# Patient Record
Sex: FEMALE | Race: WHITE | NOT HISPANIC OR LATINO | Employment: OTHER | URBAN - METROPOLITAN AREA
[De-identification: names, ages, dates, MRNs, and addresses within clinical notes are randomized per-mention and may not be internally consistent; named-entity substitution may affect disease eponyms.]

---

## 2017-03-13 ENCOUNTER — HISTORICAL (OUTPATIENT)
Dept: LAB | Facility: HOSPITAL | Age: 82
End: 2017-03-13

## 2017-08-01 ENCOUNTER — HISTORICAL (OUTPATIENT)
Dept: LAB | Facility: HOSPITAL | Age: 82
End: 2017-08-01

## 2017-08-01 LAB
BUN SERPL-MCNC: 16 MG/DL (ref 7–18)
CALCIUM SERPL-MCNC: 9.6 MG/DL (ref 8.5–10.1)
CHLORIDE SERPL-SCNC: 100 MMOL/L (ref 98–107)
CO2 SERPL-SCNC: 27.6 MMOL/L (ref 21–32)
CREAT SERPL-MCNC: 0.84 MG/DL (ref 0.6–1.3)
EST. AVERAGE GLUCOSE BLD GHB EST-MCNC: 131 MG/DL
GLUCOSE SERPL-MCNC: 107 MG/DL (ref 74–106)
HBA1C MFR BLD: 6.2 % (ref 4.5–6.2)
POTASSIUM SERPL-SCNC: 5 MMOL/L (ref 3.5–5.1)
SODIUM SERPL-SCNC: 135 MMOL/L (ref 136–145)

## 2018-01-25 ENCOUNTER — HISTORICAL (OUTPATIENT)
Dept: LAB | Facility: HOSPITAL | Age: 83
End: 2018-01-25

## 2018-01-25 LAB
ALBUMIN SERPL-MCNC: 3.6 GM/DL (ref 3.4–5)
ALBUMIN/GLOB SERPL: 0.9 RATIO (ref 1.1–2)
ALP SERPL-CCNC: 82 UNIT/L (ref 46–116)
ALT SERPL-CCNC: 28 UNIT/L (ref 12–78)
AST SERPL-CCNC: 15 UNIT/L (ref 15–37)
BILIRUB SERPL-MCNC: 0.5 MG/DL (ref 0.2–1)
BILIRUBIN DIRECT+TOT PNL SERPL-MCNC: 0.13 MG/DL (ref 0–0.2)
BILIRUBIN DIRECT+TOT PNL SERPL-MCNC: 0.33 MG/DL (ref 0–0.8)
BUN SERPL-MCNC: 11.7 MG/DL (ref 7–18)
CALCIUM SERPL-MCNC: 9.7 MG/DL (ref 8.5–10.1)
CHLORIDE SERPL-SCNC: 98 MMOL/L (ref 98–107)
CHOLEST SERPL-MCNC: 170 MG/DL (ref 0–200)
CHOLEST/HDLC SERPL: 2.2 {RATIO} (ref 0–4)
CO2 SERPL-SCNC: 28.6 MMOL/L (ref 21–32)
CREAT SERPL-MCNC: 0.65 MG/DL (ref 0.6–1.3)
ERYTHROCYTE [DISTWIDTH] IN BLOOD BY AUTOMATED COUNT: 14.1 % (ref 11.5–17)
EST. AVERAGE GLUCOSE BLD GHB EST-MCNC: 128 MG/DL
GLOBULIN SER-MCNC: 3.8 GM/DL (ref 2.4–3.5)
GLUCOSE SERPL-MCNC: 114 MG/DL (ref 74–106)
HBA1C MFR BLD: 6.1 % (ref 4.5–6.2)
HCT VFR BLD AUTO: 38.1 % (ref 37–47)
HDLC SERPL-MCNC: 78 MG/DL (ref 40–60)
HGB BLD-MCNC: 12.4 GM/DL (ref 12–16)
LDLC SERPL CALC-MCNC: 78 MG/DL (ref 0–129)
MCH RBC QN AUTO: 27.5 PG (ref 27–31)
MCHC RBC AUTO-ENTMCNC: 32.6 GM/DL (ref 33–36)
MCV RBC AUTO: 84.2 FL (ref 80–94)
PLATELET # BLD AUTO: 423 X10(3)/MCL (ref 130–400)
PMV BLD AUTO: 7.4 FL (ref 7.4–10.4)
POTASSIUM SERPL-SCNC: 4.7 MMOL/L (ref 3.5–5.1)
PROT SERPL-MCNC: 7.4 GM/DL (ref 6.4–8.2)
RBC # BLD AUTO: 4.53 X10(6)/MCL (ref 4.2–5.4)
SODIUM SERPL-SCNC: 134 MMOL/L (ref 136–145)
TRIGL SERPL-MCNC: 71 MG/DL
VLDLC SERPL CALC-MCNC: 14 MG/DL
WBC # SPEC AUTO: 6.5 X10(3)/MCL (ref 4.5–11.5)

## 2021-11-02 ENCOUNTER — HISTORICAL (OUTPATIENT)
Dept: ADMINISTRATIVE | Facility: HOSPITAL | Age: 86
End: 2021-11-02

## 2021-11-06 LAB — FINAL CULTURE: NO GROWTH

## 2024-01-12 ENCOUNTER — HOSPITAL ENCOUNTER (INPATIENT)
Facility: HOSPITAL | Age: 89
LOS: 2 days | Discharge: HOME HEALTH | DRG: 645 | End: 2024-01-14
Attending: EMERGENCY MEDICINE | Admitting: INTERNAL MEDICINE
Payer: MEDICARE

## 2024-01-12 DIAGNOSIS — R07.9 CHEST PAIN: ICD-10-CM

## 2024-01-12 DIAGNOSIS — R42 DIZZINESS: ICD-10-CM

## 2024-01-12 DIAGNOSIS — I10 HYPERTENSION, UNSPECIFIED TYPE: ICD-10-CM

## 2024-01-12 DIAGNOSIS — E87.1 HYPONATREMIA: Primary | ICD-10-CM

## 2024-01-12 DIAGNOSIS — R55 POSTURAL DIZZINESS WITH NEAR SYNCOPE: ICD-10-CM

## 2024-01-12 DIAGNOSIS — R42 POSTURAL DIZZINESS WITH NEAR SYNCOPE: ICD-10-CM

## 2024-01-12 DIAGNOSIS — I10 HYPERTENSION: ICD-10-CM

## 2024-01-12 LAB
ALBUMIN SERPL-MCNC: 3 G/DL (ref 3.4–4.8)
ALBUMIN/GLOB SERPL: 0.8 RATIO (ref 1.1–2)
ALP SERPL-CCNC: 46 UNIT/L (ref 40–150)
ALT SERPL-CCNC: 19 UNIT/L (ref 0–55)
APPEARANCE UR: CLEAR
AST SERPL-CCNC: 18 UNIT/L (ref 5–34)
BACTERIA #/AREA URNS AUTO: ABNORMAL /HPF
BASOPHILS # BLD AUTO: 0.04 X10(3)/MCL
BASOPHILS NFR BLD AUTO: 0.4 %
BILIRUB SERPL-MCNC: 0.1 MG/DL
BILIRUB UR QL STRIP.AUTO: NEGATIVE
BUN SERPL-MCNC: 37.7 MG/DL (ref 9.8–20.1)
CALCIUM SERPL-MCNC: 8.9 MG/DL (ref 8.4–10.2)
CHLORIDE SERPL-SCNC: 95 MMOL/L (ref 98–111)
CO2 SERPL-SCNC: 21 MMOL/L (ref 23–31)
COLOR UR AUTO: ABNORMAL
CREAT SERPL-MCNC: 0.75 MG/DL (ref 0.55–1.02)
EOSINOPHIL # BLD AUTO: 0.09 X10(3)/MCL (ref 0–0.9)
EOSINOPHIL NFR BLD AUTO: 0.9 %
ERYTHROCYTE [DISTWIDTH] IN BLOOD BY AUTOMATED COUNT: 15.6 % (ref 11.5–17)
GFR SERPLBLD CREATININE-BSD FMLA CKD-EPI: >60 MLS/MIN/1.73/M2
GLOBULIN SER-MCNC: 3.8 GM/DL (ref 2.4–3.5)
GLUCOSE SERPL-MCNC: 106 MG/DL (ref 75–121)
GLUCOSE UR QL STRIP.AUTO: NORMAL
HCT VFR BLD AUTO: 28.7 % (ref 37–47)
HGB BLD-MCNC: 9.2 G/DL (ref 12–16)
IMM GRANULOCYTES # BLD AUTO: 0.06 X10(3)/MCL (ref 0–0.04)
IMM GRANULOCYTES NFR BLD AUTO: 0.6 %
KETONES UR QL STRIP.AUTO: NEGATIVE
LEUKOCYTE ESTERASE UR QL STRIP.AUTO: 75
LYMPHOCYTES # BLD AUTO: 2.22 X10(3)/MCL (ref 0.6–4.6)
LYMPHOCYTES NFR BLD AUTO: 21.5 %
MAGNESIUM SERPL-MCNC: 1.6 MG/DL (ref 1.6–2.6)
MCH RBC QN AUTO: 27.1 PG (ref 27–31)
MCHC RBC AUTO-ENTMCNC: 32.1 G/DL (ref 33–36)
MCV RBC AUTO: 84.4 FL (ref 80–94)
MONOCYTES # BLD AUTO: 0.94 X10(3)/MCL (ref 0.1–1.3)
MONOCYTES NFR BLD AUTO: 9.1 %
MUCOUS THREADS URNS QL MICRO: ABNORMAL /LPF
NEUTROPHILS # BLD AUTO: 6.97 X10(3)/MCL (ref 2.1–9.2)
NEUTROPHILS NFR BLD AUTO: 67.5 %
NITRITE UR QL STRIP.AUTO: NEGATIVE
NRBC BLD AUTO-RTO: 0 %
OSMOLALITY SERPL: 277 MOSM/KG (ref 280–300)
OSMOLALITY UR: 352 MOSM/KG (ref 300–1300)
PH UR STRIP.AUTO: 5 [PH]
PLATELET # BLD AUTO: 517 X10(3)/MCL (ref 130–400)
PMV BLD AUTO: 8.4 FL (ref 7.4–10.4)
POCT GLUCOSE: 95 MG/DL (ref 70–110)
POTASSIUM SERPL-SCNC: 4.7 MMOL/L (ref 3.5–5.1)
PROT SERPL-MCNC: 6.8 GM/DL (ref 5.8–7.6)
PROT UR QL STRIP.AUTO: NEGATIVE
RBC # BLD AUTO: 3.4 X10(6)/MCL (ref 4.2–5.4)
RBC #/AREA URNS AUTO: ABNORMAL /HPF
RBC UR QL AUTO: NEGATIVE
SODIUM SERPL-SCNC: 124 MMOL/L (ref 132–146)
SODIUM UR-SCNC: 63 MMOL/L
SP GR UR STRIP.AUTO: 1.01 (ref 1–1.03)
SQUAMOUS #/AREA URNS LPF: ABNORMAL /HPF
UROBILINOGEN UR STRIP-ACNC: NORMAL
WBC # SPEC AUTO: 10.32 X10(3)/MCL (ref 4.5–11.5)
WBC #/AREA URNS AUTO: ABNORMAL /HPF

## 2024-01-12 PROCEDURE — 83935 ASSAY OF URINE OSMOLALITY: CPT | Performed by: EMERGENCY MEDICINE

## 2024-01-12 PROCEDURE — 25000003 PHARM REV CODE 250

## 2024-01-12 PROCEDURE — 21400001 HC TELEMETRY ROOM

## 2024-01-12 PROCEDURE — 81001 URINALYSIS AUTO W/SCOPE: CPT | Performed by: NURSE PRACTITIONER

## 2024-01-12 PROCEDURE — 83735 ASSAY OF MAGNESIUM: CPT | Performed by: NURSE PRACTITIONER

## 2024-01-12 PROCEDURE — 85025 COMPLETE CBC W/AUTO DIFF WBC: CPT | Performed by: NURSE PRACTITIONER

## 2024-01-12 PROCEDURE — 80053 COMPREHEN METABOLIC PANEL: CPT | Performed by: NURSE PRACTITIONER

## 2024-01-12 PROCEDURE — 93010 ELECTROCARDIOGRAM REPORT: CPT | Mod: ,,, | Performed by: INTERNAL MEDICINE

## 2024-01-12 PROCEDURE — 11000001 HC ACUTE MED/SURG PRIVATE ROOM

## 2024-01-12 PROCEDURE — 93005 ELECTROCARDIOGRAM TRACING: CPT

## 2024-01-12 PROCEDURE — 84300 ASSAY OF URINE SODIUM: CPT | Performed by: EMERGENCY MEDICINE

## 2024-01-12 PROCEDURE — 83930 ASSAY OF BLOOD OSMOLALITY: CPT | Performed by: EMERGENCY MEDICINE

## 2024-01-12 PROCEDURE — 63600175 PHARM REV CODE 636 W HCPCS: Performed by: NURSE PRACTITIONER

## 2024-01-12 PROCEDURE — 99285 EMERGENCY DEPT VISIT HI MDM: CPT | Mod: 25

## 2024-01-12 RX ORDER — METOPROLOL SUCCINATE 50 MG/1
50 TABLET, EXTENDED RELEASE ORAL DAILY
COMMUNITY

## 2024-01-12 RX ORDER — BUSPIRONE HYDROCHLORIDE 7.5 MG/1
7.5 TABLET ORAL 3 TIMES DAILY
COMMUNITY

## 2024-01-12 RX ORDER — LEVOTHYROXINE SODIUM 125 UG/1
125 TABLET ORAL
COMMUNITY

## 2024-01-12 RX ORDER — LISINOPRIL 20 MG/1
20 TABLET ORAL DAILY
Status: ON HOLD | COMMUNITY
End: 2024-02-12 | Stop reason: DRUGHIGH

## 2024-01-12 RX ORDER — METFORMIN HYDROCHLORIDE 500 MG/1
500 TABLET ORAL 2 TIMES DAILY WITH MEALS
Status: ON HOLD | COMMUNITY
End: 2024-05-13

## 2024-01-12 RX ORDER — MELOXICAM 15 MG/1
15 TABLET ORAL DAILY
Status: ON HOLD | COMMUNITY
End: 2024-01-14 | Stop reason: HOSPADM

## 2024-01-12 RX ORDER — FUROSEMIDE 20 MG/1
20 TABLET ORAL 2 TIMES DAILY
Status: ON HOLD | COMMUNITY
End: 2024-01-14 | Stop reason: HOSPADM

## 2024-01-12 RX ORDER — SODIUM CHLORIDE 9 MG/ML
INJECTION, SOLUTION INTRAVENOUS CONTINUOUS
Status: DISCONTINUED | OUTPATIENT
Start: 2024-01-12 | End: 2024-01-13

## 2024-01-12 RX ORDER — OMEPRAZOLE 20 MG/1
20 TABLET, DELAYED RELEASE ORAL DAILY
COMMUNITY

## 2024-01-12 RX ORDER — HYDRALAZINE HYDROCHLORIDE 20 MG/ML
10 INJECTION INTRAMUSCULAR; INTRAVENOUS EVERY 4 HOURS PRN
Status: DISCONTINUED | OUTPATIENT
Start: 2024-01-12 | End: 2024-01-13

## 2024-01-12 RX ADMIN — SODIUM CHLORIDE: 9 INJECTION, SOLUTION INTRAVENOUS at 10:01

## 2024-01-12 RX ADMIN — HYDRALAZINE HYDROCHLORIDE 10 MG: 20 INJECTION INTRAMUSCULAR; INTRAVENOUS at 11:01

## 2024-01-12 NOTE — FIRST PROVIDER EVALUATION
"Medical screening examination initiated.  I have conducted a focused provider triage encounter, findings are as follows:    Brief history of present illness:  89 y/o female presents with c/o dizziness/off balance for 1 month but worse today. Feels very unbalanced. Working with PT but coming today because worse.     Vitals:    01/12/24 1530   BP: (!) 191/89   Pulse: 74   Resp: 14   Temp: 98.3 °F (36.8 °C)   TempSrc: Oral   SpO2: 97%   Weight: 55.8 kg (123 lb)   Height: 5' 3" (1.6 m)       Pertinent physical exam:  alert, nonlabored, oriented     Brief workup plan:  EKG, labs, imaging    Preliminary workup initiated; this workup will be continued and followed by the physician or advanced practice provider that is assigned to the patient when roomed.  "

## 2024-01-13 PROBLEM — R42 DIZZINESS: Status: ACTIVE | Noted: 2024-01-13

## 2024-01-13 PROBLEM — E87.1 HYPONATREMIA: Status: ACTIVE | Noted: 2024-01-13

## 2024-01-13 PROBLEM — I10 HYPERTENSION: Status: ACTIVE | Noted: 2024-01-13

## 2024-01-13 LAB
ALBUMIN SERPL-MCNC: 2.9 G/DL (ref 3.4–4.8)
ALBUMIN/GLOB SERPL: 0.9 RATIO (ref 1.1–2)
ALP SERPL-CCNC: 44 UNIT/L (ref 40–150)
ALT SERPL-CCNC: 17 UNIT/L (ref 0–55)
ANION GAP SERPL CALC-SCNC: 8 MEQ/L
AST SERPL-CCNC: 18 UNIT/L (ref 5–34)
AV INDEX (PROSTH): 0.41
AV LVOT MEAN GRADIENT: 2 MMHG
AV LVOT PEAK GRADIENT: 5 MMHG
AV MEAN GRADIENT: 15 MMHG
AV PEAK GRADIENT: 27 MMHG
AV VALVE AREA BY VELOCITY RATIO: 1.35 CM²
AV VALVE AREA: 1.29 CM²
AV VELOCITY RATIO: 0.43
BILIRUB SERPL-MCNC: 0.3 MG/DL
BSA FOR ECHO PROCEDURE: 1.57 M2
BUN SERPL-MCNC: 20.7 MG/DL (ref 9.8–20.1)
BUN SERPL-MCNC: 24.1 MG/DL (ref 9.8–20.1)
CALCIUM SERPL-MCNC: 8.6 MG/DL (ref 8.4–10.2)
CALCIUM SERPL-MCNC: 8.9 MG/DL (ref 8.4–10.2)
CHLORIDE SERPL-SCNC: 100 MMOL/L (ref 98–111)
CHLORIDE SERPL-SCNC: 96 MMOL/L (ref 98–111)
CO2 SERPL-SCNC: 24 MMOL/L (ref 23–31)
CO2 SERPL-SCNC: 25 MMOL/L (ref 23–31)
CREAT SERPL-MCNC: 0.57 MG/DL (ref 0.55–1.02)
CREAT SERPL-MCNC: 0.71 MG/DL (ref 0.55–1.02)
CREAT/UREA NIT SERPL: 34
CV ECHO LV RWT: 0.65 CM
DOP CALC AO PEAK VEL: 2.6 M/S
DOP CALC AO VTI: 42.5 CM
DOP CALC LVOT AREA: 3.1 CM2
DOP CALC LVOT DIAMETER: 2 CM
DOP CALC LVOT PEAK VEL: 1.12 M/S
DOP CALC LVOT STROKE VOLUME: 54.95 CM3
DOP CALC MV VTI: 27.3 CM
DOP CALCLVOT PEAK VEL VTI: 17.5 CM
E WAVE DECELERATION TIME: 156 MSEC
E/A RATIO: 0.51
E/E' RATIO: 13.2 M/S
ECHO LV POSTERIOR WALL: 1.2 CM (ref 0.6–1.1)
ERYTHROCYTE [DISTWIDTH] IN BLOOD BY AUTOMATED COUNT: 15 % (ref 11.5–17)
FERRITIN SERPL-MCNC: 76.23 NG/ML (ref 4.63–204)
FOLATE SERPL-MCNC: 13.7 NG/ML (ref 7–31.4)
FRACTIONAL SHORTENING: 29 % (ref 28–44)
GFR SERPLBLD CREATININE-BSD FMLA CKD-EPI: >60 MLS/MIN/1.73/M2
GFR SERPLBLD CREATININE-BSD FMLA CKD-EPI: >60 MLS/MIN/1.73/M2
GLOBULIN SER-MCNC: 3.3 GM/DL (ref 2.4–3.5)
GLUCOSE SERPL-MCNC: 215 MG/DL (ref 75–121)
GLUCOSE SERPL-MCNC: 91 MG/DL (ref 75–121)
HCT VFR BLD AUTO: 29.5 % (ref 37–47)
HGB BLD-MCNC: 9.7 G/DL (ref 12–16)
INTERVENTRICULAR SEPTUM: 1.16 CM (ref 0.6–1.1)
IRON SATN MFR SERPL: 10 % (ref 20–50)
IRON SERPL-MCNC: 25 UG/DL (ref 50–170)
LEFT ATRIUM SIZE: 3 CM
LEFT ATRIUM VOLUME INDEX MOD: 19.4 ML/M2
LEFT ATRIUM VOLUME MOD: 30.5 CM3
LEFT INTERNAL DIMENSION IN SYSTOLE: 2.59 CM (ref 2.1–4)
LEFT VENTRICLE DIASTOLIC VOLUME INDEX: 36.31 ML/M2
LEFT VENTRICLE DIASTOLIC VOLUME: 57 ML
LEFT VENTRICLE MASS INDEX: 90 G/M2
LEFT VENTRICLE SYSTOLIC VOLUME INDEX: 15.5 ML/M2
LEFT VENTRICLE SYSTOLIC VOLUME: 24.4 ML
LEFT VENTRICULAR INTERNAL DIMENSION IN DIASTOLE: 3.67 CM (ref 3.5–6)
LEFT VENTRICULAR MASS: 141.89 G
LV LATERAL E/E' RATIO: 11 M/S
LV SEPTAL E/E' RATIO: 16.5 M/S
LVOT MG: 3 MMHG
LVOT MV: 0.79 CM/S
MAGNESIUM SERPL-MCNC: 1.6 MG/DL (ref 1.6–2.6)
MCH RBC QN AUTO: 26.8 PG (ref 27–31)
MCHC RBC AUTO-ENTMCNC: 32.9 G/DL (ref 33–36)
MCV RBC AUTO: 81.5 FL (ref 80–94)
MV MEAN GRADIENT: 4 MMHG
MV PEAK A VEL: 1.3 M/S
MV PEAK E VEL: 0.66 M/S
MV PEAK GRADIENT: 10 MMHG
MV STENOSIS PRESSURE HALF TIME: 221 MS
MV VALVE AREA BY CONTINUITY EQUATION: 2.01 CM2
MV VALVE AREA P 1/2 METHOD: 1 CM2
NRBC BLD AUTO-RTO: 0 %
OHS LV EJECTION FRACTION SIMPSONS BIPLANE MOD: 63 %
PHOSPHATE SERPL-MCNC: 2.8 MG/DL (ref 2.3–4.7)
PISA TR MAX VEL: 2.9 M/S
PLATELET # BLD AUTO: 548 X10(3)/MCL (ref 130–400)
PMV BLD AUTO: 8.3 FL (ref 7.4–10.4)
POTASSIUM SERPL-SCNC: 3.8 MMOL/L (ref 3.5–5.1)
POTASSIUM SERPL-SCNC: 4.6 MMOL/L (ref 3.5–5.1)
PROT SERPL-MCNC: 6.2 GM/DL (ref 5.8–7.6)
PV PEAK GRADIENT: 8 MMHG
PV PEAK VELOCITY: 1.41 M/S
RA PRESSURE ESTIMATED: 3 MMHG
RBC # BLD AUTO: 3.62 X10(6)/MCL (ref 4.2–5.4)
RV TB RVSP: 6 MMHG
SODIUM SERPL-SCNC: 129 MMOL/L (ref 132–146)
SODIUM SERPL-SCNC: 129 MMOL/L (ref 132–146)
T4 FREE SERPL-MCNC: 1.09 NG/DL (ref 0.7–1.48)
TDI LATERAL: 0.06 M/S
TDI SEPTAL: 0.04 M/S
TDI: 0.05 M/S
TIBC SERPL-MCNC: 218 UG/DL (ref 70–310)
TIBC SERPL-MCNC: 243 UG/DL (ref 250–450)
TR MAX PG: 34 MMHG
TRANSFERRIN SERPL-MCNC: 227 MG/DL
TRICUSPID ANNULAR PLANE SYSTOLIC EXCURSION: 2.54 CM
TSH SERPL-ACNC: 3.75 UIU/ML (ref 0.35–4.94)
TV REST PULMONARY ARTERY PRESSURE: 37 MMHG
VIT B12 SERPL-MCNC: 774 PG/ML (ref 213–816)
WBC # SPEC AUTO: 9.29 X10(3)/MCL (ref 4.5–11.5)
Z-SCORE OF LEFT VENTRICULAR DIMENSION IN END DIASTOLE: -2.06
Z-SCORE OF LEFT VENTRICULAR DIMENSION IN END SYSTOLE: -0.61

## 2024-01-13 PROCEDURE — 84100 ASSAY OF PHOSPHORUS: CPT | Performed by: INTERNAL MEDICINE

## 2024-01-13 PROCEDURE — 80048 BASIC METABOLIC PNL TOTAL CA: CPT | Performed by: INTERNAL MEDICINE

## 2024-01-13 PROCEDURE — 83735 ASSAY OF MAGNESIUM: CPT | Performed by: INTERNAL MEDICINE

## 2024-01-13 PROCEDURE — 82746 ASSAY OF FOLIC ACID SERUM: CPT | Performed by: INTERNAL MEDICINE

## 2024-01-13 PROCEDURE — 84439 ASSAY OF FREE THYROXINE: CPT | Performed by: INTERNAL MEDICINE

## 2024-01-13 PROCEDURE — 25000003 PHARM REV CODE 250: Performed by: INTERNAL MEDICINE

## 2024-01-13 PROCEDURE — 21400001 HC TELEMETRY ROOM

## 2024-01-13 PROCEDURE — 82728 ASSAY OF FERRITIN: CPT | Performed by: INTERNAL MEDICINE

## 2024-01-13 PROCEDURE — 83540 ASSAY OF IRON: CPT | Performed by: INTERNAL MEDICINE

## 2024-01-13 PROCEDURE — 84443 ASSAY THYROID STIM HORMONE: CPT | Performed by: INTERNAL MEDICINE

## 2024-01-13 PROCEDURE — 85027 COMPLETE CBC AUTOMATED: CPT | Performed by: INTERNAL MEDICINE

## 2024-01-13 PROCEDURE — 82607 VITAMIN B-12: CPT | Performed by: INTERNAL MEDICINE

## 2024-01-13 PROCEDURE — 80053 COMPREHEN METABOLIC PANEL: CPT | Performed by: INTERNAL MEDICINE

## 2024-01-13 RX ORDER — ACETAMINOPHEN 500 MG
1000 TABLET ORAL EVERY 6 HOURS PRN
Status: DISCONTINUED | OUTPATIENT
Start: 2024-01-13 | End: 2024-01-14 | Stop reason: HOSPADM

## 2024-01-13 RX ORDER — SODIUM CHLORIDE 9 MG/ML
INJECTION, SOLUTION INTRAVENOUS CONTINUOUS
Status: ACTIVE | OUTPATIENT
Start: 2024-01-13 | End: 2024-01-14

## 2024-01-13 RX ORDER — NIFEDIPINE 30 MG/1
30 TABLET, EXTENDED RELEASE ORAL DAILY
Status: DISCONTINUED | OUTPATIENT
Start: 2024-01-13 | End: 2024-01-14

## 2024-01-13 RX ORDER — MECLIZINE HYDROCHLORIDE 25 MG/1
25 TABLET ORAL 3 TIMES DAILY PRN
Status: DISCONTINUED | OUTPATIENT
Start: 2024-01-13 | End: 2024-01-14 | Stop reason: HOSPADM

## 2024-01-13 RX ORDER — METOPROLOL SUCCINATE 50 MG/1
50 TABLET, EXTENDED RELEASE ORAL DAILY
Status: DISCONTINUED | OUTPATIENT
Start: 2024-01-13 | End: 2024-01-14 | Stop reason: HOSPADM

## 2024-01-13 RX ORDER — PROCHLORPERAZINE EDISYLATE 5 MG/ML
5 INJECTION INTRAMUSCULAR; INTRAVENOUS EVERY 6 HOURS PRN
Status: DISCONTINUED | OUTPATIENT
Start: 2024-01-13 | End: 2024-01-14 | Stop reason: HOSPADM

## 2024-01-13 RX ORDER — PANTOPRAZOLE SODIUM 40 MG/1
40 TABLET, DELAYED RELEASE ORAL DAILY
Status: DISCONTINUED | OUTPATIENT
Start: 2024-01-13 | End: 2024-01-14 | Stop reason: HOSPADM

## 2024-01-13 RX ORDER — ALUMINUM HYDROXIDE, MAGNESIUM HYDROXIDE, AND SIMETHICONE 1200; 120; 1200 MG/30ML; MG/30ML; MG/30ML
30 SUSPENSION ORAL 4 TIMES DAILY PRN
Status: DISCONTINUED | OUTPATIENT
Start: 2024-01-13 | End: 2024-01-14 | Stop reason: HOSPADM

## 2024-01-13 RX ORDER — ACETAMINOPHEN 325 MG/1
650 TABLET ORAL EVERY 4 HOURS PRN
Status: DISCONTINUED | OUTPATIENT
Start: 2024-01-13 | End: 2024-01-14 | Stop reason: HOSPADM

## 2024-01-13 RX ORDER — SODIUM CHLORIDE 1 G/1
1000 TABLET ORAL 2 TIMES DAILY
Status: DISCONTINUED | OUTPATIENT
Start: 2024-01-13 | End: 2024-01-14 | Stop reason: HOSPADM

## 2024-01-13 RX ORDER — SODIUM CHLORIDE 0.9 % (FLUSH) 0.9 %
10 SYRINGE (ML) INJECTION
Status: DISCONTINUED | OUTPATIENT
Start: 2024-01-13 | End: 2024-01-14 | Stop reason: HOSPADM

## 2024-01-13 RX ORDER — ONDANSETRON HYDROCHLORIDE 2 MG/ML
4 INJECTION, SOLUTION INTRAVENOUS EVERY 4 HOURS PRN
Status: DISCONTINUED | OUTPATIENT
Start: 2024-01-13 | End: 2024-01-14 | Stop reason: HOSPADM

## 2024-01-13 RX ORDER — HYDRALAZINE HYDROCHLORIDE 20 MG/ML
20 INJECTION INTRAMUSCULAR; INTRAVENOUS EVERY 4 HOURS PRN
Status: DISCONTINUED | OUTPATIENT
Start: 2024-01-13 | End: 2024-01-14 | Stop reason: HOSPADM

## 2024-01-13 RX ORDER — POLYETHYLENE GLYCOL 3350 17 G/17G
17 POWDER, FOR SOLUTION ORAL 2 TIMES DAILY PRN
Status: DISCONTINUED | OUTPATIENT
Start: 2024-01-13 | End: 2024-01-14 | Stop reason: HOSPADM

## 2024-01-13 RX ORDER — LEVOTHYROXINE SODIUM 125 UG/1
125 TABLET ORAL
Status: DISCONTINUED | OUTPATIENT
Start: 2024-01-13 | End: 2024-01-14 | Stop reason: HOSPADM

## 2024-01-13 RX ORDER — AMOXICILLIN 250 MG
2 CAPSULE ORAL 2 TIMES DAILY PRN
Status: DISCONTINUED | OUTPATIENT
Start: 2024-01-13 | End: 2024-01-14 | Stop reason: HOSPADM

## 2024-01-13 RX ORDER — LABETALOL HYDROCHLORIDE 5 MG/ML
10 INJECTION, SOLUTION INTRAVENOUS EVERY 4 HOURS PRN
Status: DISCONTINUED | OUTPATIENT
Start: 2024-01-13 | End: 2024-01-14 | Stop reason: HOSPADM

## 2024-01-13 RX ORDER — DOCUSATE SODIUM 100 MG/1
100 CAPSULE, LIQUID FILLED ORAL 2 TIMES DAILY
Status: DISCONTINUED | OUTPATIENT
Start: 2024-01-13 | End: 2024-01-14 | Stop reason: HOSPADM

## 2024-01-13 RX ORDER — TALC
6 POWDER (GRAM) TOPICAL NIGHTLY PRN
Status: DISCONTINUED | OUTPATIENT
Start: 2024-01-13 | End: 2024-01-14

## 2024-01-13 RX ORDER — CLONIDINE HYDROCHLORIDE 0.2 MG/1
0.2 TABLET ORAL 3 TIMES DAILY PRN
Status: DISCONTINUED | OUTPATIENT
Start: 2024-01-13 | End: 2024-01-14 | Stop reason: HOSPADM

## 2024-01-13 RX ORDER — BUSPIRONE HYDROCHLORIDE 7.5 MG/1
7.5 TABLET ORAL 3 TIMES DAILY
Status: DISCONTINUED | OUTPATIENT
Start: 2024-01-13 | End: 2024-01-14 | Stop reason: HOSPADM

## 2024-01-13 RX ADMIN — CLONIDINE HYDROCHLORIDE 0.2 MG: 0.2 TABLET ORAL at 08:01

## 2024-01-13 RX ADMIN — NIFEDIPINE 30 MG: 30 TABLET, FILM COATED, EXTENDED RELEASE ORAL at 12:01

## 2024-01-13 RX ADMIN — PANTOPRAZOLE SODIUM 40 MG: 40 TABLET, DELAYED RELEASE ORAL at 10:01

## 2024-01-13 RX ADMIN — DOCUSATE SODIUM 100 MG: 100 CAPSULE, LIQUID FILLED ORAL at 08:01

## 2024-01-13 RX ADMIN — LEVOTHYROXINE SODIUM 125 MCG: 125 TABLET ORAL at 06:01

## 2024-01-13 RX ADMIN — BUSPIRONE HYDROCHLORIDE 7.5 MG: 7.5 TABLET ORAL at 04:01

## 2024-01-13 RX ADMIN — BUSPIRONE HYDROCHLORIDE 7.5 MG: 7.5 TABLET ORAL at 08:01

## 2024-01-13 RX ADMIN — BUSPIRONE HYDROCHLORIDE 7.5 MG: 7.5 TABLET ORAL at 10:01

## 2024-01-13 RX ADMIN — DOCUSATE SODIUM 100 MG: 100 CAPSULE, LIQUID FILLED ORAL at 10:01

## 2024-01-13 RX ADMIN — SODIUM CHLORIDE 1000 MG: 1 TABLET ORAL at 10:01

## 2024-01-13 RX ADMIN — SODIUM CHLORIDE: 9 INJECTION, SOLUTION INTRAVENOUS at 10:01

## 2024-01-13 RX ADMIN — SODIUM CHLORIDE 1000 MG: 1 TABLET ORAL at 03:01

## 2024-01-13 RX ADMIN — SODIUM CHLORIDE 1000 MG: 1 TABLET ORAL at 08:01

## 2024-01-13 RX ADMIN — MELATONIN TAB 3 MG 6 MG: 3 TAB at 08:01

## 2024-01-13 NOTE — NURSING
Nurses Note -- 4 Eyes      1/13/2024   1:41 AM      Skin assessed during: Admit      [] No Altered Skin Integrity Present    []Prevention Measures Documented      [x] Yes- Altered Skin Integrity Present or Discovered   [x] LDA Added if Not in Epic (Describe Wound)   [] New Altered Skin Integrity was Present on Admit and Documented in LDA   [] Wound Image Taken    Wound Care Consulted? No    Attending Nurse:  Ramona Estrada RN    Second RN/Staff Member:   Dali Diggs LPN

## 2024-01-13 NOTE — H&P
[facility] Hospital Medicine - H&P Note    Patient Name: Jacques Richmond  MRN: [id number]  PCP: No primary care provider on file.  Admitting Physician: Natalie Fleming MD  Admission Class: IP- Inpatient   Date of Service: 01/13/2024  Code status: DNR (I discussed with patient)    Chief Complaint   Dizziness and high blood pressure    History of Present Illness   This is a 89-year-old female with medical history of hypertension, T2DM, hypothyroid, osteoarthritis, anxiety, GERD present to the ED with complaint of dizziness and high blood pressure at home.  Report for few months she has has been experiencing dizziness that described mainly as orthostatic lightheadedness.  For the past few days she has been checking her blood pressure at home and has been significantly elevated despite taking her blood pressure medications.  She denies any headache, nausea, vomiting, blurry vision, speech difficulty, chest pain, extremity weakness or numbness.    On arrival to ED she was afebrile, heart rate 70s, hypertensive 191/89.  EKG sinus rhythm with first-degree AV block.  Labs notable for hemoglobin 9.2, platelets 517, sodium 124, creatinine 0.75, serum osmolality 277, urine osmolality 352, urine sodium 63.  Urinalysis unremarkable.  CT head showed no acute intracranial finding.  There is chronic microangiopathic changes and atrophy as well as old lacunar infarct in the periventricular regions.    No recent labs available for comparison, last labs show sodium 134 in 2018.    She was started on normal saline at 100 mL/hour referred to hospital medicine service for further evaluation management.     ROS   Except as documented, all other systems reviewed and negative     Past Medical History   Hypertension  T2DM  Hypothyroid  Anxiety  GERD  Osteoarthritis    Past Surgical History   Hysterectomy  Cholecystectomy  Appendectomy  Hernia repair  Cataract    Social History     Social History     Tobacco Use     Smoking status: Never    Smokeless tobacco: Never   Substance Use Topics    Alcohol use: Not on file        Family History   Reviewed and negative    Allergies   Penicillins    Home Medications     Prior to Admission medications    Medication Sig Start Date End Date Taking? Authorizing Provider   busPIRone (BUSPAR) 7.5 MG tablet Take 7.5 mg by mouth 3 (three) times daily.   Yes Provider, Historical   furosemide (LASIX) 20 MG tablet Take 20 mg by mouth 2 (two) times daily.   Yes Provider, Historical   levothyroxine (SYNTHROID) 125 MCG tablet Take 125 mcg by mouth before breakfast.   Yes Provider, Historical   lisinopriL (PRINIVIL,ZESTRIL) 20 MG tablet Take 20 mg by mouth once daily.   Yes Provider, Historical   meloxicam (MOBIC) 15 MG tablet Take 15 mg by mouth once daily.   Yes Provider, Historical   metFORMIN (GLUCOPHAGE) 500 MG tablet Take 500 mg by mouth 2 (two) times daily with meals.   Yes Provider, Historical   metoprolol succinate (TOPROL-XL) 50 MG 24 hr tablet Take 50 mg by mouth once daily.   Yes Provider, Historical   omeprazole (PRILOSEC OTC) 20 MG tablet Take 20 mg by mouth once daily.   Yes Provider, Historical        Physical Exam   Vital Signs  Temp:  [97.9 °F (36.6 °C)-98.3 °F (36.8 °C)]   Pulse:  [71-76]   Resp:  [14-19]   BP: (168-191)/()   SpO2:  [96 %-100 %]    General: Appears comfortable  HEENT: NC/AT, mucous membrane moist  Neck:  No JVD  Chest: CTABL  CVS: Regular rhythm. Normal S1/S2.  No pedal edema  Abdomen: nondistended, normoactive BS, soft and non-tender.  MSK: No obvious deformity or joint swelling  Skin: Warm and dry, normal skin turgor  Neuro: AAOx3, no focal neurological deficit  Psych: Cooperative    Labs     Recent Labs     01/12/24  1556   WBC 10.32   RBC 3.40*   HGB 9.2*   HCT 28.7*   MCV 84.4   MCH 27.1   MCHC 32.1*   RDW 15.6   *        Recent Labs     01/12/24  1556   *   K 4.7   CHLORIDE 95*   CO2 21*   BUN 37.7*   CREATININE 0.75   EGFRNORACEVR >60    GLUCOSE 106   CALCIUM 8.9   MG 1.60   ALBUMIN 3.0*   GLOBULIN 3.8*   ALKPHOS 46   ALT 19   AST 18   BILITOT 0.1        Microbiology Results (last 7 days)       ** No results found for the last 168 hours. **           Imaging     CT Head Without Contrast   Final Result      1.  No acute intracranial findings identified.      2.  Old lacunar infarcts, chronic microangiopathic ischemia and atrophy.         Electronically signed by: Daniel Mercer   Date:    01/12/2024   Time:    21:03      MRI Brain W WO Contrast    (Results Pending)     Assessment   Hyponatremia  Suspect SIADH given high urine sodium and osmolarity  Orthostatic lightheadedness  Hypertensive urgency  Normocytic anemia    History of hypothyroid, hyperlipidemia, T2DM, anxiety, GERD      Plan   Check orthostatic blood pressure  Transthoracic echo and carotid ultrasound  MRI brain with and without contrast  Start nifedipine XL 30 mg daily  Resumed metoprolol succinate 50 mg daily  Hold ACE inhibitor as it can worsen hyponatremia  Check TSH and free T4  Check chest x-ray  Discontinue NS, fluid restriction 1200 mL daily, start sodium chloride tablet 1 g b.i.d.  Check iron profile, ferritin, B12 and folate  VTE Prophylaxis:  SCDs, enoxaparin 40 mg subQ daily  Discussed resuscitation, CPR, defibrillation, intubation, mechanical ventilation.  Patient wishes to be DNR and DNI.    Natalie Fleming MD  Internal Medicine

## 2024-01-13 NOTE — ED PROVIDER NOTES
Encounter Date: 1/12/2024       History     Chief Complaint   Patient presents with    Hypertension     HTN, look Lisinopril at 1100. Report balance issues x1 month, in PT for it. States getting worse. Denies CP, SOB, facial droop, slurred speech     HPI  89-year-old female with history of DM2, HTN, and chronic dizziness presents to the ED due to elevated blood pressure.  She was asked to reporting balance issues and dizziness for the past 1 month for which she has been attending therapy. Daughter at bedside does not recall her getting imaging for dizziness.     Review of patient's allergies indicates:   Allergen Reactions    Penicillins Itching     rash     History reviewed. No pertinent past medical history.  History reviewed. No pertinent surgical history.  History reviewed. No pertinent family history.  Social History     Tobacco Use    Smoking status: Never    Smokeless tobacco: Never     Review of Systems   Constitutional:  Negative for fever.   Respiratory:  Negative for shortness of breath.    Cardiovascular:  Negative for chest pain.   Genitourinary:  Negative for dysuria.   Neurological:  Positive for dizziness. Negative for syncope, facial asymmetry, speech difficulty, weakness, light-headedness and headaches.   Psychiatric/Behavioral:  Negative for confusion.        Physical Exam     Initial Vitals [01/12/24 1530]   BP Pulse Resp Temp SpO2   (!) 191/89 74 14 98.3 °F (36.8 °C) 97 %      MAP       --         Physical Exam    Nursing note and vitals reviewed.  Constitutional: She appears well-developed and well-nourished.   HENT:   Head: Normocephalic and atraumatic.   Mouth/Throat: Oropharynx is clear and moist.   Eyes: Conjunctivae are normal. Pupils are equal, round, and reactive to light.   Cardiovascular:  Normal rate and regular rhythm.     Exam reveals no gallop and no friction rub.       No murmur heard.  Pulmonary/Chest: Breath sounds normal. She has no wheezes. She has no rhonchi. She has no  rales.   Abdominal: Abdomen is soft. Bowel sounds are normal. She exhibits no distension. There is no abdominal tenderness. There is no rebound.   Musculoskeletal:         General: No edema.     Neurological: She is alert and oriented to person, place, and time.   Skin: Skin is warm and dry. Capillary refill takes less than 2 seconds.   Psychiatric: She has a normal mood and affect.         ED Course   Procedures  Labs Reviewed   CBC WITH DIFFERENTIAL - Abnormal; Notable for the following components:       Result Value    RBC 3.40 (*)     Hgb 9.2 (*)     Hct 28.7 (*)     MCHC 32.1 (*)     Platelet 517 (*)     IG# 0.06 (*)     All other components within normal limits   COMPREHENSIVE METABOLIC PANEL - Abnormal; Notable for the following components:    Sodium Level 124 (*)     Chloride 95 (*)     Carbon Dioxide 21 (*)     Blood Urea Nitrogen 37.7 (*)     Albumin Level 3.0 (*)     Globulin 3.8 (*)     Albumin/Globulin Ratio 0.8 (*)     All other components within normal limits   URINALYSIS, REFLEX TO URINE CULTURE - Abnormal; Notable for the following components:    Leukocyte Esterase, UA 75 (*)     Mucous, UA Trace (*)     All other components within normal limits   OSMOLALITY, SERUM - Abnormal; Notable for the following components:    Osmolality 277 (*)     All other components within normal limits   MAGNESIUM - Normal   OSMOLALITY, URINE RANDOM - Normal   SODIUM, URINE, RANDOM     EKG Readings: (Independently Interpreted)   Sinus rhythm with first-degree AV block rate of 74, CA interval 210 milliseconds, no QTC prolongation, no ectopy, normal axis, no ST elevations     ECG Results              EKG 12-lead (Final result)  Result time 01/12/24 19:18:45      Final result by Interface, Lab In [facility] (01/12/24 19:18:45)                   Narrative:    Test Reason : I10,    Vent. Rate : 074 BPM     Atrial Rate : 074 BPM     P-R Int : 210 ms          QRS Dur : 084 ms      QT Int : 364 ms       P-R-T Axes : 070 024 031  degrees     QTc Int : 404 ms    Sinus rhythm with 1st degree A-V block  Confirmed by Jaime Grant MD ([id number]) on 1/12/2024 7:18:39 PM    Referred By:             Confirmed By:Jaime Grant MD                                  Imaging Results              CT Head Without Contrast (Final result)  Result time 01/12/24 21:03:03      Final result by Daniel Mercer MD (01/12/24 21:03:03)                   Impression:      1.  No acute intracranial findings identified.    2.  Old lacunar infarcts, chronic microangiopathic ischemia and atrophy.      Electronically signed by: Daniel Mercer  Date:    01/12/2024  Time:    21:03               Narrative:    EXAMINATION:  CT HEAD WITHOUT CONTRAST    CLINICAL HISTORY:  Dizziness, non-specific;Dizziness, persistent/recurrent, cardiac or vascular cause suspected;    TECHNIQUE:  Sequential axial images were performed of the brain without contrast.    Dose product length of 934 mGycm. Automated exposure control was utilized to minimize radiation dose.    COMPARISON:  None available.    FINDINGS:  There is no intracranial mass effect, midline shift, hydrocephalus or hemorrhage. There is no sulcal effacement or low attenuation changes to suggest recent large vessel territory infarction.  There are periventricular old lacunar infarcts.  Chronic appearing periventricular and subcortical white matter low attenuation changes are present and are consistent with chronic microangiopathic ischemia. The ventricular system and sulcal markings prominence is consistent with atrophy. There is no acute extra axial fluid collection. Visualized paranasal sinuses are clear without mucosal thickening, polypoidal abnormality or air-fluid levels. Mastoid air cells aeration is optimal.                                       Medications - No data to display  Medical Decision Making    ED assessment:    89-year-old female presents with complaints of elevated blood pressure and dizziness for the past few  months.    Differential diagnosis (including but not limited to):   Vertigo, intracranial mass, electrolyte abnormality, anxiety, dehydration    ED management:  Refer to ED course    Amount and/or Complexity of Data Reviewed  Independent Historian:      Details: Daughter states dizziness been present for the past few months, no known imaging.  Labs: ordered. Decision-making details documented in ED Course.  Radiology: ordered. Decision-making details documented in ED Course.  ECG/medicine tests: ordered.    Risk  Prescription drug management.               ED Course as of 01/12/24 2122 Fri Jan 12, 2024 2116 Patient found to be hyponatremic with sodium 124 and non-anion gap metabolic acidosis.  BP currently 173/85 on most recent check without pharmacologic intervention.  CT head ordered due to dizziness however shows no masses or acute lesions, -if old lacunar infarcts and chronic microangiopathic ischemic changes.  Urine and serum osmolality ordered.  Urine sodium ordered.  Admitted to hospital medicine for hyponatremia. [BB]   2120 Sodium(!): 124 [BB]   2120 CO2(!): 21 [BB]      ED Course User Index  [BB] Jeremiah Taveras MD                           Clinical Impression:  Final diagnoses:  [I10] Hypertension, unspecified type  [E87.1] Hyponatremia (Primary)  [R42] Dizziness          ED Disposition Condition    Admit                 Jeremiah Taveras MD  Resident  01/12/24 2122

## 2024-01-14 VITALS
HEIGHT: 63 IN | OXYGEN SATURATION: 92 % | DIASTOLIC BLOOD PRESSURE: 70 MMHG | RESPIRATION RATE: 18 BRPM | SYSTOLIC BLOOD PRESSURE: 144 MMHG | TEMPERATURE: 98 F | HEART RATE: 71 BPM | WEIGHT: 132.25 LBS | BODY MASS INDEX: 23.43 KG/M2

## 2024-01-14 LAB
ALBUMIN SERPL-MCNC: 2.7 G/DL (ref 3.4–4.8)
ALBUMIN/GLOB SERPL: 0.9 RATIO (ref 1.1–2)
ALP SERPL-CCNC: 42 UNIT/L (ref 40–150)
ALT SERPL-CCNC: 20 UNIT/L (ref 0–55)
AST SERPL-CCNC: 17 UNIT/L (ref 5–34)
BASOPHILS # BLD AUTO: 0.05 X10(3)/MCL
BASOPHILS NFR BLD AUTO: 0.6 %
BILIRUB SERPL-MCNC: 0.3 MG/DL
BUN SERPL-MCNC: 26.3 MG/DL (ref 9.8–20.1)
CALCIUM SERPL-MCNC: 8.6 MG/DL (ref 8.4–10.2)
CHLORIDE SERPL-SCNC: 104 MMOL/L (ref 98–111)
CO2 SERPL-SCNC: 22 MMOL/L (ref 23–31)
CREAT SERPL-MCNC: 0.7 MG/DL (ref 0.55–1.02)
EOSINOPHIL # BLD AUTO: 0.07 X10(3)/MCL (ref 0–0.9)
EOSINOPHIL NFR BLD AUTO: 0.8 %
ERYTHROCYTE [DISTWIDTH] IN BLOOD BY AUTOMATED COUNT: 15.5 % (ref 11.5–17)
GFR SERPLBLD CREATININE-BSD FMLA CKD-EPI: >60 MLS/MIN/1.73/M2
GLOBULIN SER-MCNC: 3 GM/DL (ref 2.4–3.5)
GLUCOSE SERPL-MCNC: 110 MG/DL (ref 75–121)
HCT VFR BLD AUTO: 26.6 % (ref 37–47)
HGB BLD-MCNC: 8.6 G/DL (ref 12–16)
IMM GRANULOCYTES # BLD AUTO: 0.04 X10(3)/MCL (ref 0–0.04)
IMM GRANULOCYTES NFR BLD AUTO: 0.5 %
LEFT CCA DIST DIAS: 3 CM/S
LEFT CCA DIST SYS: 52 CM/S
LEFT CCA PROX DIAS: 2 CM/S
LEFT CCA PROX SYS: 54 CM/S
LEFT ECA DIAS: 0 CM/S
LEFT ECA SYS: 50 CM/S
LEFT ICA DIST DIAS: 9 CM/S
LEFT ICA DIST SYS: 54 CM/S
LEFT ICA MID DIAS: 10 CM/S
LEFT ICA MID SYS: 57 CM/S
LEFT ICA PROX DIAS: 7 CM/S
LEFT ICA PROX SYS: 45 CM/S
LEFT VERTEBRAL DIAS: 3 CM/S
LEFT VERTEBRAL SYS: 32 CM/S
LYMPHOCYTES # BLD AUTO: 1.73 X10(3)/MCL (ref 0.6–4.6)
LYMPHOCYTES NFR BLD AUTO: 20.5 %
MCH RBC QN AUTO: 26.8 PG (ref 27–31)
MCHC RBC AUTO-ENTMCNC: 32.3 G/DL (ref 33–36)
MCV RBC AUTO: 82.9 FL (ref 80–94)
MONOCYTES # BLD AUTO: 0.77 X10(3)/MCL (ref 0.1–1.3)
MONOCYTES NFR BLD AUTO: 9.1 %
NEUTROPHILS # BLD AUTO: 5.78 X10(3)/MCL (ref 2.1–9.2)
NEUTROPHILS NFR BLD AUTO: 68.5 %
NRBC BLD AUTO-RTO: 0 %
OHS CV CAROTID RIGHT ICA EDV HIGHEST: 5
OHS CV CAROTID ULTRASOUND LEFT ICA/CCA RATIO: 1.1
OHS CV CAROTID ULTRASOUND RIGHT ICA/CCA RATIO: 0.92
OHS CV PV CAROTID LEFT HIGHEST CCA: 54
OHS CV PV CAROTID LEFT HIGHEST ICA: 57
OHS CV PV CAROTID RIGHT HIGHEST CCA: 62
OHS CV PV CAROTID RIGHT HIGHEST ICA: 57
OHS CV US CAROTID LEFT HIGHEST EDV: 10
PLATELET # BLD AUTO: 478 X10(3)/MCL (ref 130–400)
PMV BLD AUTO: 8.3 FL (ref 7.4–10.4)
POTASSIUM SERPL-SCNC: 4.5 MMOL/L (ref 3.5–5.1)
PROT SERPL-MCNC: 5.7 GM/DL (ref 5.8–7.6)
RBC # BLD AUTO: 3.21 X10(6)/MCL (ref 4.2–5.4)
RIGHT CCA DIST DIAS: 6 CM/S
RIGHT CCA DIST SYS: 62 CM/S
RIGHT CCA PROX DIAS: 4 CM/S
RIGHT CCA PROX SYS: 54 CM/S
RIGHT ECA DIAS: 0 CM/S
RIGHT ECA SYS: 122 CM/S
RIGHT ICA DIST DIAS: 3 CM/S
RIGHT ICA DIST SYS: 38 CM/S
RIGHT ICA MID DIAS: 4 CM/S
RIGHT ICA MID SYS: 40 CM/S
RIGHT ICA PROX DIAS: 5 CM/S
RIGHT ICA PROX SYS: 57 CM/S
RIGHT VERTEBRAL DIAS: 3 CM/S
RIGHT VERTEBRAL SYS: 41 CM/S
SODIUM SERPL-SCNC: 133 MMOL/L (ref 132–146)
WBC # SPEC AUTO: 8.44 X10(3)/MCL (ref 4.5–11.5)

## 2024-01-14 PROCEDURE — 25000003 PHARM REV CODE 250: Performed by: INTERNAL MEDICINE

## 2024-01-14 PROCEDURE — 85025 COMPLETE CBC W/AUTO DIFF WBC: CPT | Performed by: INTERNAL MEDICINE

## 2024-01-14 PROCEDURE — 80053 COMPREHEN METABOLIC PANEL: CPT | Performed by: INTERNAL MEDICINE

## 2024-01-14 RX ORDER — POLYETHYLENE GLYCOL 3350 17 G/17G
17 POWDER, FOR SOLUTION ORAL DAILY PRN
Refills: 0
Start: 2024-01-14

## 2024-01-14 RX ORDER — POLYETHYLENE GLYCOL 3350 17 G/17G
17 POWDER, FOR SOLUTION ORAL DAILY PRN
Status: DISCONTINUED | OUTPATIENT
Start: 2024-01-14 | End: 2024-01-14 | Stop reason: HOSPADM

## 2024-01-14 RX ORDER — DOCUSATE SODIUM 100 MG/1
100 CAPSULE, LIQUID FILLED ORAL 2 TIMES DAILY
Refills: 0 | Status: ON HOLD
Start: 2024-01-14 | End: 2024-05-13 | Stop reason: HOSPADM

## 2024-01-14 RX ORDER — LISINOPRIL 20 MG/1
20 TABLET ORAL DAILY
Status: DISCONTINUED | OUTPATIENT
Start: 2024-01-14 | End: 2024-01-14 | Stop reason: HOSPADM

## 2024-01-14 RX ORDER — ACETAMINOPHEN 500 MG
500 TABLET ORAL EVERY 6 HOURS PRN
Refills: 0
Start: 2024-01-14

## 2024-01-14 RX ADMIN — LEVOTHYROXINE SODIUM 125 MCG: 125 TABLET ORAL at 05:01

## 2024-01-14 RX ADMIN — BUSPIRONE HYDROCHLORIDE 7.5 MG: 7.5 TABLET ORAL at 09:01

## 2024-01-14 RX ADMIN — DOCUSATE SODIUM 100 MG: 100 CAPSULE, LIQUID FILLED ORAL at 09:01

## 2024-01-14 RX ADMIN — LISINOPRIL 20 MG: 20 TABLET ORAL at 09:01

## 2024-01-14 RX ADMIN — SODIUM CHLORIDE 1000 MG: 1 TABLET ORAL at 09:01

## 2024-01-14 RX ADMIN — METOPROLOL SUCCINATE 50 MG: 50 TABLET, EXTENDED RELEASE ORAL at 09:01

## 2024-01-14 RX ADMIN — PANTOPRAZOLE SODIUM 40 MG: 40 TABLET, DELAYED RELEASE ORAL at 09:01

## 2024-01-14 NOTE — PLAN OF CARE
Spoke to daughter Florence via phone about home health and palliative care. She is requesting Nursing Specialties and has no preference for palliative care., Referral sent to [facility] and notified Sanchez. Referral sent to [facility] of [facility].

## 2024-01-14 NOTE — DISCHARGE SUMMARY
[facility] Hospital Medicine Discharge Summary    Admit Date: 1/12/2024  Discharge Date and Time: 1/14/20248:54 AM  Admitting Physician: NENA Team  Discharging Physician: Inocencio Augustine MD.  Primary Care Physician: Estrella Moraes NP    Discharge Diagnoses:  Hyponatremia  Suspect SIADH given high urine sodium and osmolarity  Orthostatic lightheadedness: resolved   Hypertensive urgency: resolved   Normocytic anemia     History of hypothyroid, hyperlipidemia, T2DM, anxiety, GERD    Hospital Course:   This is a 89-year-old female with medical history of hypertension, T2DM, hypothyroid, osteoarthritis, anxiety, GERD present to the ED with complaint of dizziness and high blood pressure at home.  Report for few months she has has been experiencing dizziness that described mainly as orthostatic lightheadedness.  For the past few days she has been checking her blood pressure at home and has been significantly elevated despite taking her blood pressure medications.  She denies any headache, nausea, vomiting, blurry vision, speech difficulty, chest pain, extremity weakness or numbness.     On arrival to ED she was afebrile, heart rate 70s, hypertensive 191/89.  EKG sinus rhythm with first-degree AV block.  Labs notable for hemoglobin 9.2, platelets 517, sodium 124, creatinine 0.75, serum osmolality 277, urine osmolality 352, urine sodium 63.  Urinalysis unremarkable.  CT head showed no acute intracranial finding.  There is chronic microangiopathic changes and atrophy as well as old lacunar infarct in the periventricular regions.     No recent labs available for comparison, last labs show sodium 134 in 2018.     She was started on normal saline at 100 mL/hour referred to hospital medicine service for further evaluation management. Patient was observed overnight. Rpt labs showed improvement in sodium level. Per family patient does use a lot of laxatives and has diarrhea. Educated patient  on proper use of laxatives. Added stool softeners to her daily regimen and advised to use laxatives only if she is constipated for >3 days.   She was stable and asymptomatic. Had no signs/symptoms of sepsis. Family advised on home BP checks and we will also set up a HH with palliative program.    Patient orthostatic vitals was checked and was normal. Family was concerned about patients over all well being as she lives alone. They are there close to her but do not stay with her. Went over different options. Discussed about NH, SNF and Home with HH and sitter service. They want to try sitter service. If she does not do well in the future they will consider NH placement.     They also want to have a PCP in [place]     Pt was seen and examined on the day of discharge  Vitals:  VITAL SIGNS: 24 HRS MIN & MAX LAST   Temp  Min: 98.1 °F (36.7 °C)  Max: 98.9 °F (37.2 °C) 98.1 °F (36.7 °C)   BP  Min: 88/55  Max: 145/77 137/79   Pulse  Min: 72  Max: 104  72   Resp  Min: 18  Max: 20 18   SpO2  Min: 94 %  Max: 97 % 97 %       Physical Exam:  Heart RRR  Lungs clear   Abdomen soft and non tender   Neuro: No FND      Procedures Performed: No admission procedures for hospital encounter.     Significant Diagnostic Studies: See Full reports for all details    Recent Labs   Lab 01/12/24  1556 01/13/24  0649 01/14/24  0620   WBC 10.32 9.29 8.44   RBC 3.40* 3.62* 3.21*   HGB 9.2* 9.7* 8.6*   HCT 28.7* 29.5* 26.6*   MCV 84.4 81.5 82.9   MCH 27.1 26.8* 26.8*   MCHC 32.1* 32.9* 32.3*   RDW 15.6 15.0 15.5   * 548* 478*   MPV 8.4 8.3 8.3       Recent Labs   Lab 01/12/24  1556 01/13/24  0028 01/13/24  0649 01/14/24  0620   * 129* 129* 133   K 4.7 3.8 4.6 4.5   CO2 21* 25 24 22*   BUN 37.7* 24.1* 20.7* 26.3*   CREATININE 0.75 0.71 0.57 0.70   CALCIUM 8.9 8.9 8.6 8.6   MG 1.60  --  1.60  --    ALBUMIN 3.0*  --  2.9* 2.7*   ALKPHOS 46  --  44 42   ALT 19  --  17 20   AST 18  --  18 17   BILITOT 0.1  --  0.3 0.3        Microbiology  Results (last 7 days)       ** No results found for the last 168 hours. **             Echo    Left Ventricle: The left ventricle is normal in size. Ventricular mass   is normal. Mildly increased wall thickness. Normal wall motion. There is   normal systolic function with a visually estimated ejection fraction of 60   - 65%. Grade I diastolic dysfunction.    Right Ventricle: Normal right ventricular cavity size. Systolic   function is normal.    Aortic Valve: The aortic valve is a trileaflet valve. There is moderate   aortic valve sclerosis. There is mild to moderate stenosis. Aortic valve   area by VTI is 1.29 cm². Aortic valve peak velocity is 2.6 m/s. Mean   gradient is 15 mmHg. The dimensionless index is 0.41. There is no   significant regurgitation.    Mitral Valve: There is moderate posterior mitral annular calcification   present. There is mild stenosis. The mean pressure gradient across the   mitral valve is 4 mmHg at a heart rate of  bpm. There is trace   regurgitation.    Tricuspid Valve: There is no stenosis. There is mild regurgitation.    Pulmonic Valve: There is no stenosis.    IVC/SVC: Normal venous pressure at 3 mmHg.  X-Ray Chest 1 View  Narrative: EXAMINATION:  XR CHEST 1 VIEW    CLINICAL HISTORY:  lung lesions;    TECHNIQUE:  Single frontal view of the chest was performed.    COMPARISON:  09/18/2019    FINDINGS:  The lungs are clear, with normal appearance of pulmonary vasculature and no pleural effusion or pneumothorax.    The cardiac silhouette is normal in size. The hilar and mediastinal contours are unremarkable.    Bones are intact.  Impression: No acute abnormality.    Electronically signed by: Patricio Magana MD  Date:    01/13/2024  Time:    11:34         Medication List        START taking these medications      acetaminophen 500 MG tablet  Commonly known as: TYLENOL  Take 1 tablet (500 mg total) by mouth every 6 (six) hours as needed for Pain.     docusate sodium 100 MG capsule  Commonly  known as: COLACE  Take 1 capsule (100 mg total) by mouth 2 (two) times daily.     polyethylene glycol 17 gram Pwpk  Commonly known as: GLYCOLAX  Take 17 g by mouth daily as needed for Constipation.            CONTINUE taking these medications      busPIRone 7.5 MG tablet  Commonly known as: BUSPAR     levothyroxine 125 MCG tablet  Commonly known as: SYNTHROID     lisinopriL 20 MG tablet  Commonly known as: PRINIVIL,ZESTRIL     metFORMIN 500 MG tablet  Commonly known as: GLUCOPHAGE     metoprolol succinate 50 MG 24 hr tablet  Commonly known as: TOPROL-XL     omeprazole 20 MG tablet  Commonly known as: PRILOSEC OTC            STOP taking these medications      furosemide 20 MG tablet  Commonly known as: LASIX     meloxicam 15 MG tablet  Commonly known as: MOBIC               Where to Get Your Medications        Information about where to get these medications is not yet available    Ask your nurse or doctor about these medications  acetaminophen 500 MG tablet  docusate sodium 100 MG capsule  polyethylene glycol 17 gram Pwpk          Explained in detail to the patient about the discharge plan, medications, and follow-up visits. Pt understands and agrees with the treatment plan  Discharge Disposition:  Home with HH and palliative care   Discharged Condition: stable  Diet-   Dietary Orders (From admission, onward)       Start     Ordered    01/13/24 0009  Diet diabetic Fluid - 1200mL  Diet effective now        Question:  Fluid restriction:  Answer:  Fluid - 1200mL    01/13/24 0009                   Medications Per DC med rec  Activities as tolerated   Follow-up Information       Estrella Moraes NP Follow up in 2 week(s).    Specialty: Internal Medicine  Contact information:  [address] [facility] [address] [phone]                           For further questions contact hospitalist office    Discharge time 33 minutes    For worsening symptoms, chest pain, shortness of breath, increased abdominal pain,  high grade fever, stroke or stroke like symptoms, immediately go to the nearest Emergency Room or call 911 as soon as possible.      Inocencio Mckeon M.D, on 1/14/2024. at 8:54 AM.

## 2024-01-14 NOTE — NURSING
Pt IV and tele discontinued.  Discharge instructions and follow up appointments reviewed with patient and patient's daughters.  Pt going home with HH and palliative care via POV.

## 2024-01-19 ENCOUNTER — PATIENT OUTREACH (OUTPATIENT)
Dept: ADMINISTRATIVE | Facility: CLINIC | Age: 89
End: 2024-01-19
Payer: MEDICARE

## 2024-02-12 ENCOUNTER — HOSPITAL ENCOUNTER (INPATIENT)
Facility: HOSPITAL | Age: 89
LOS: 2 days | Discharge: HOSPICE | DRG: 645 | End: 2024-02-14
Attending: STUDENT IN AN ORGANIZED HEALTH CARE EDUCATION/TRAINING PROGRAM | Admitting: STUDENT IN AN ORGANIZED HEALTH CARE EDUCATION/TRAINING PROGRAM
Payer: MEDICARE

## 2024-02-12 DIAGNOSIS — R55 NEAR SYNCOPE: ICD-10-CM

## 2024-02-12 DIAGNOSIS — R07.9 CHEST PAIN: ICD-10-CM

## 2024-02-12 DIAGNOSIS — R42 DIZZINESS: ICD-10-CM

## 2024-02-12 DIAGNOSIS — E87.1 HYPONATREMIA: Primary | ICD-10-CM

## 2024-02-12 DIAGNOSIS — R29.6 RECURRENT FALLS: ICD-10-CM

## 2024-02-12 PROBLEM — R91.8 MASS OF UPPER LOBE OF LEFT LUNG: Status: ACTIVE | Noted: 2024-02-12

## 2024-02-12 LAB
ALBUMIN SERPL-MCNC: 2.7 G/DL (ref 3.4–4.8)
ALBUMIN/GLOB SERPL: 0.8 RATIO (ref 1.1–2)
ALP SERPL-CCNC: 47 UNIT/L (ref 40–150)
ALT SERPL-CCNC: 19 UNIT/L (ref 0–55)
APPEARANCE UR: CLEAR
APTT PPP: 31.2 SECONDS (ref 23.2–33.7)
AST SERPL-CCNC: 24 UNIT/L (ref 5–34)
BACTERIA #/AREA URNS AUTO: NORMAL /HPF
BASOPHILS # BLD AUTO: 0.04 X10(3)/MCL
BASOPHILS NFR BLD AUTO: 0.4 %
BILIRUB SERPL-MCNC: 0.2 MG/DL
BILIRUB UR QL STRIP.AUTO: NEGATIVE
BUN SERPL-MCNC: 19.1 MG/DL (ref 9.8–20.1)
CALCIUM SERPL-MCNC: 8.4 MG/DL (ref 8.4–10.2)
CHLORIDE SERPL-SCNC: 88 MMOL/L (ref 98–111)
CO2 SERPL-SCNC: 24 MMOL/L (ref 23–31)
COLOR UR AUTO: COLORLESS
CREAT SERPL-MCNC: 0.6 MG/DL (ref 0.55–1.02)
EOSINOPHIL # BLD AUTO: 0.08 X10(3)/MCL (ref 0–0.9)
EOSINOPHIL NFR BLD AUTO: 0.8 %
ERYTHROCYTE [DISTWIDTH] IN BLOOD BY AUTOMATED COUNT: 15.3 % (ref 11.5–17)
GFR SERPLBLD CREATININE-BSD FMLA CKD-EPI: >60 MLS/MIN/1.73/M2
GLOBULIN SER-MCNC: 3.5 GM/DL (ref 2.4–3.5)
GLUCOSE SERPL-MCNC: 93 MG/DL (ref 75–121)
GLUCOSE UR QL STRIP.AUTO: NORMAL
HCT VFR BLD AUTO: 28.5 % (ref 37–47)
HGB BLD-MCNC: 9.5 G/DL (ref 12–16)
IMM GRANULOCYTES # BLD AUTO: 0.09 X10(3)/MCL (ref 0–0.04)
IMM GRANULOCYTES NFR BLD AUTO: 0.9 %
INR PPP: 1.1
KETONES UR QL STRIP.AUTO: NEGATIVE
LEUKOCYTE ESTERASE UR QL STRIP.AUTO: NEGATIVE
LYMPHOCYTES # BLD AUTO: 1.9 X10(3)/MCL (ref 0.6–4.6)
LYMPHOCYTES NFR BLD AUTO: 18.9 %
MAGNESIUM SERPL-MCNC: 1.3 MG/DL (ref 1.6–2.6)
MCH RBC QN AUTO: 27.1 PG (ref 27–31)
MCHC RBC AUTO-ENTMCNC: 33.3 G/DL (ref 33–36)
MCV RBC AUTO: 81.4 FL (ref 80–94)
MONOCYTES # BLD AUTO: 0.72 X10(3)/MCL (ref 0.1–1.3)
MONOCYTES NFR BLD AUTO: 7.2 %
NEUTROPHILS # BLD AUTO: 7.22 X10(3)/MCL (ref 2.1–9.2)
NEUTROPHILS NFR BLD AUTO: 71.8 %
NITRITE UR QL STRIP.AUTO: NEGATIVE
NRBC BLD AUTO-RTO: 0 %
OHS QRS DURATION: 88 MS
OHS QTC CALCULATION: 425 MS
PH UR STRIP.AUTO: 6 [PH]
PLATELET # BLD AUTO: 495 X10(3)/MCL (ref 130–400)
PMV BLD AUTO: 11 FL (ref 7.4–10.4)
POTASSIUM SERPL-SCNC: 3.7 MMOL/L (ref 3.5–5.1)
PROT SERPL-MCNC: 6.2 GM/DL (ref 5.8–7.6)
PROT UR QL STRIP.AUTO: NEGATIVE
PROTHROMBIN TIME: 14.1 SECONDS (ref 12.5–14.5)
RBC # BLD AUTO: 3.5 X10(6)/MCL (ref 4.2–5.4)
RBC #/AREA URNS AUTO: NORMAL /HPF
RBC UR QL AUTO: NEGATIVE
SODIUM SERPL-SCNC: 120 MMOL/L (ref 132–146)
SP GR UR STRIP.AUTO: 1.01 (ref 1–1.03)
SQUAMOUS #/AREA URNS LPF: NORMAL /HPF
TROPONIN I SERPL-MCNC: <0.01 NG/ML (ref 0–0.04)
UROBILINOGEN UR STRIP-ACNC: NORMAL
WBC # SPEC AUTO: 10.05 X10(3)/MCL (ref 4.5–11.5)
WBC #/AREA URNS AUTO: NORMAL /HPF

## 2024-02-12 PROCEDURE — 85730 THROMBOPLASTIN TIME PARTIAL: CPT | Performed by: NURSE PRACTITIONER

## 2024-02-12 PROCEDURE — 84295 ASSAY OF SERUM SODIUM: CPT | Performed by: INTERNAL MEDICINE

## 2024-02-12 PROCEDURE — 80053 COMPREHEN METABOLIC PANEL: CPT | Performed by: NURSE PRACTITIONER

## 2024-02-12 PROCEDURE — 83930 ASSAY OF BLOOD OSMOLALITY: CPT | Performed by: INTERNAL MEDICINE

## 2024-02-12 PROCEDURE — 85610 PROTHROMBIN TIME: CPT | Performed by: NURSE PRACTITIONER

## 2024-02-12 PROCEDURE — 81015 MICROSCOPIC EXAM OF URINE: CPT | Performed by: NURSE PRACTITIONER

## 2024-02-12 PROCEDURE — 85025 COMPLETE CBC W/AUTO DIFF WBC: CPT | Performed by: NURSE PRACTITIONER

## 2024-02-12 PROCEDURE — 21400001 HC TELEMETRY ROOM

## 2024-02-12 PROCEDURE — 11000001 HC ACUTE MED/SURG PRIVATE ROOM

## 2024-02-12 PROCEDURE — 99285 EMERGENCY DEPT VISIT HI MDM: CPT | Mod: 25

## 2024-02-12 PROCEDURE — 25000003 PHARM REV CODE 250: Performed by: STUDENT IN AN ORGANIZED HEALTH CARE EDUCATION/TRAINING PROGRAM

## 2024-02-12 PROCEDURE — 93010 ELECTROCARDIOGRAM REPORT: CPT | Mod: ,,, | Performed by: INTERNAL MEDICINE

## 2024-02-12 PROCEDURE — 93005 ELECTROCARDIOGRAM TRACING: CPT

## 2024-02-12 PROCEDURE — 25000003 PHARM REV CODE 250: Performed by: INTERNAL MEDICINE

## 2024-02-12 PROCEDURE — 25500020 PHARM REV CODE 255: Performed by: STUDENT IN AN ORGANIZED HEALTH CARE EDUCATION/TRAINING PROGRAM

## 2024-02-12 PROCEDURE — 83735 ASSAY OF MAGNESIUM: CPT | Performed by: NURSE PRACTITIONER

## 2024-02-12 PROCEDURE — 63600175 PHARM REV CODE 636 W HCPCS: Performed by: STUDENT IN AN ORGANIZED HEALTH CARE EDUCATION/TRAINING PROGRAM

## 2024-02-12 PROCEDURE — 84484 ASSAY OF TROPONIN QUANT: CPT | Performed by: NURSE PRACTITIONER

## 2024-02-12 PROCEDURE — 96360 HYDRATION IV INFUSION INIT: CPT

## 2024-02-12 RX ORDER — PANTOPRAZOLE SODIUM 40 MG/1
40 TABLET, DELAYED RELEASE ORAL DAILY
Status: DISCONTINUED | OUTPATIENT
Start: 2024-02-13 | End: 2024-02-14 | Stop reason: HOSPADM

## 2024-02-12 RX ORDER — MELOXICAM 15 MG/1
15 TABLET ORAL DAILY
Status: ON HOLD | COMMUNITY
Start: 2024-02-11 | End: 2024-05-13 | Stop reason: HOSPADM

## 2024-02-12 RX ORDER — AMOXICILLIN 250 MG
2 CAPSULE ORAL 2 TIMES DAILY PRN
Status: DISCONTINUED | OUTPATIENT
Start: 2024-02-13 | End: 2024-02-14 | Stop reason: HOSPADM

## 2024-02-12 RX ORDER — SODIUM CHLORIDE 0.9 % (FLUSH) 0.9 %
10 SYRINGE (ML) INJECTION
Status: DISCONTINUED | OUTPATIENT
Start: 2024-02-13 | End: 2024-02-14 | Stop reason: HOSPADM

## 2024-02-12 RX ORDER — POLYETHYLENE GLYCOL 3350 17 G/17G
17 POWDER, FOR SOLUTION ORAL 2 TIMES DAILY PRN
Status: DISCONTINUED | OUTPATIENT
Start: 2024-02-13 | End: 2024-02-14 | Stop reason: HOSPADM

## 2024-02-12 RX ORDER — ATORVASTATIN CALCIUM 10 MG/1
20 TABLET, FILM COATED ORAL DAILY
Status: DISCONTINUED | OUTPATIENT
Start: 2024-02-13 | End: 2024-02-14 | Stop reason: HOSPADM

## 2024-02-12 RX ORDER — SODIUM CHLORIDE 1 G/1
1000 TABLET ORAL 2 TIMES DAILY
Status: DISCONTINUED | OUTPATIENT
Start: 2024-02-12 | End: 2024-02-14 | Stop reason: HOSPADM

## 2024-02-12 RX ORDER — METOPROLOL SUCCINATE 50 MG/1
50 TABLET, EXTENDED RELEASE ORAL DAILY
Status: DISCONTINUED | OUTPATIENT
Start: 2024-02-13 | End: 2024-02-14 | Stop reason: HOSPADM

## 2024-02-12 RX ORDER — MECLIZINE HYDROCHLORIDE 25 MG/1
25 TABLET ORAL
Status: COMPLETED | OUTPATIENT
Start: 2024-02-12 | End: 2024-02-12

## 2024-02-12 RX ORDER — TALC
6 POWDER (GRAM) TOPICAL NIGHTLY PRN
Status: DISCONTINUED | OUTPATIENT
Start: 2024-02-13 | End: 2024-02-14 | Stop reason: HOSPADM

## 2024-02-12 RX ORDER — ACETAMINOPHEN 325 MG/1
650 TABLET ORAL EVERY 4 HOURS PRN
Status: DISCONTINUED | OUTPATIENT
Start: 2024-02-13 | End: 2024-02-14 | Stop reason: HOSPADM

## 2024-02-12 RX ORDER — ATORVASTATIN CALCIUM 20 MG/1
20 TABLET, FILM COATED ORAL DAILY
COMMUNITY
Start: 2023-11-22

## 2024-02-12 RX ORDER — NIFEDIPINE 30 MG/1
30 TABLET, EXTENDED RELEASE ORAL DAILY
Status: DISCONTINUED | OUTPATIENT
Start: 2024-02-12 | End: 2024-02-14 | Stop reason: HOSPADM

## 2024-02-12 RX ORDER — FUROSEMIDE 20 MG/1
20 TABLET ORAL DAILY
Status: ON HOLD | COMMUNITY
Start: 2024-02-11 | End: 2024-02-14 | Stop reason: HOSPADM

## 2024-02-12 RX ORDER — PROCHLORPERAZINE EDISYLATE 5 MG/ML
5 INJECTION INTRAMUSCULAR; INTRAVENOUS EVERY 6 HOURS PRN
Status: DISCONTINUED | OUTPATIENT
Start: 2024-02-13 | End: 2024-02-14 | Stop reason: HOSPADM

## 2024-02-12 RX ORDER — BUSPIRONE HYDROCHLORIDE 7.5 MG/1
7.5 TABLET ORAL 3 TIMES DAILY
Status: DISCONTINUED | OUTPATIENT
Start: 2024-02-12 | End: 2024-02-14 | Stop reason: HOSPADM

## 2024-02-12 RX ORDER — HYDRALAZINE HYDROCHLORIDE 20 MG/ML
10 INJECTION INTRAMUSCULAR; INTRAVENOUS EVERY 4 HOURS PRN
Status: DISCONTINUED | OUTPATIENT
Start: 2024-02-12 | End: 2024-02-14 | Stop reason: HOSPADM

## 2024-02-12 RX ORDER — ONDANSETRON HYDROCHLORIDE 2 MG/ML
4 INJECTION, SOLUTION INTRAVENOUS EVERY 4 HOURS PRN
Status: DISCONTINUED | OUTPATIENT
Start: 2024-02-13 | End: 2024-02-14 | Stop reason: HOSPADM

## 2024-02-12 RX ORDER — LABETALOL HYDROCHLORIDE 5 MG/ML
10 INJECTION, SOLUTION INTRAVENOUS EVERY 4 HOURS PRN
Status: DISCONTINUED | OUTPATIENT
Start: 2024-02-12 | End: 2024-02-14 | Stop reason: HOSPADM

## 2024-02-12 RX ORDER — ENOXAPARIN SODIUM 100 MG/ML
40 INJECTION SUBCUTANEOUS EVERY 24 HOURS
Status: DISCONTINUED | OUTPATIENT
Start: 2024-02-13 | End: 2024-02-14 | Stop reason: HOSPADM

## 2024-02-12 RX ORDER — ALUMINUM HYDROXIDE, MAGNESIUM HYDROXIDE, AND SIMETHICONE 1200; 120; 1200 MG/30ML; MG/30ML; MG/30ML
30 SUSPENSION ORAL 4 TIMES DAILY PRN
Status: DISCONTINUED | OUTPATIENT
Start: 2024-02-13 | End: 2024-02-14 | Stop reason: HOSPADM

## 2024-02-12 RX ORDER — LISINOPRIL 30 MG/1
30 TABLET ORAL DAILY
COMMUNITY
Start: 2024-01-10

## 2024-02-12 RX ORDER — CLONIDINE HYDROCHLORIDE 0.2 MG/1
0.2 TABLET ORAL 3 TIMES DAILY PRN
Status: DISCONTINUED | OUTPATIENT
Start: 2024-02-12 | End: 2024-02-14 | Stop reason: HOSPADM

## 2024-02-12 RX ORDER — MECLIZINE HYDROCHLORIDE 25 MG/1
25 TABLET ORAL 3 TIMES DAILY PRN
Status: DISCONTINUED | OUTPATIENT
Start: 2024-02-13 | End: 2024-02-14 | Stop reason: HOSPADM

## 2024-02-12 RX ORDER — LEVOTHYROXINE SODIUM 125 UG/1
125 TABLET ORAL
Status: DISCONTINUED | OUTPATIENT
Start: 2024-02-13 | End: 2024-02-14 | Stop reason: HOSPADM

## 2024-02-12 RX ADMIN — BUSPIRONE HYDROCHLORIDE 7.5 MG: 7.5 TABLET ORAL at 11:02

## 2024-02-12 RX ADMIN — SODIUM CHLORIDE, POTASSIUM CHLORIDE, SODIUM LACTATE AND CALCIUM CHLORIDE 1000 ML: 600; 310; 30; 20 INJECTION, SOLUTION INTRAVENOUS at 03:02

## 2024-02-12 RX ADMIN — NIFEDIPINE 30 MG: 30 TABLET, FILM COATED, EXTENDED RELEASE ORAL at 11:02

## 2024-02-12 RX ADMIN — IOPAMIDOL 100 ML: 755 INJECTION, SOLUTION INTRAVENOUS at 05:02

## 2024-02-12 RX ADMIN — MECLIZINE HYDROCHLORIDE 25 MG: 25 TABLET ORAL at 03:02

## 2024-02-12 RX ADMIN — SODIUM CHLORIDE 1000 MG: 1 TABLET ORAL at 11:02

## 2024-02-12 NOTE — ED PROVIDER NOTES
Encounter Date: 2/12/2024    SCRIBE #1 NOTE: I, Ariel Lamb, am scribing for, and in the presence of,  Jaron Tyson MD. I have scribed the entire note.       History     Chief Complaint   Patient presents with    Dizziness     C/o dizziness for the last couple of days. Had fall yesterday morning. Denies chest pain or sob on arrival.      89 year old female presents to the ED via EMS for dizziness. Pt states she has been experiencing intermittent dizziness for some time. Pt states her dizziness has worsened to the point that she cannot walk. Pt fell yesterday due to her dizziness. Pt states she feels generally weak at this time. Pt denies any chest pain or SOB. Pt was taken off of her diuretic after her last admission here and has been experiencing some LE edema. Pt was placed back on her diuretic on Friday.     The history is provided by the patient. No  was used.     Review of patient's allergies indicates:   Allergen Reactions    Penicillins Itching     rash     No past medical history on file.  No past surgical history on file.  No family history on file.  Social History     Tobacco Use    Smoking status: Never    Smokeless tobacco: Never     Review of Systems   Constitutional:  Negative for chills and fever.        Generalized weakness   HENT:  Negative for congestion, drooling and sore throat.    Eyes:  Negative for pain and visual disturbance.   Respiratory:  Negative for chest tightness, shortness of breath and wheezing.    Cardiovascular:  Positive for leg swelling. Negative for chest pain and palpitations.   Gastrointestinal:  Negative for abdominal pain, nausea and vomiting.   Genitourinary:  Negative for dysuria and hematuria.   Musculoskeletal:  Negative for back pain, neck pain and neck stiffness.   Skin:  Negative for pallor and rash.   Neurological:  Positive for dizziness. Negative for weakness and numbness.   Hematological:  Does not bruise/bleed easily.       Physical  Exam     Initial Vitals   BP Pulse Resp Temp SpO2   02/12/24 1345 02/12/24 1157 02/12/24 1157 02/12/24 1157 02/12/24 1157   (!) 198/87 72 16 97.9 °F (36.6 °C) 97 %      MAP       --                Physical Exam    Nursing note and vitals reviewed.  Constitutional: She appears well-developed and well-nourished. She is not diaphoretic. No distress.   Elderly and frail    HENT:   Nose: Nose normal.   Mouth/Throat: Mucous membranes are dry.   Bruising to the left side of the face.    Eyes: EOM are normal. Pupils are equal, round, and reactive to light.   Neck: Neck supple.   Normal range of motion.  Cardiovascular:  Normal rate and regular rhythm.           No murmur heard.  Pulmonary/Chest: Breath sounds normal. No respiratory distress. She has no wheezes. She has no rales.   Abdominal: Abdomen is soft. She exhibits no distension. There is no abdominal tenderness.   Musculoskeletal:      Cervical back: Normal range of motion and neck supple.     Neurological: She is alert and oriented to person, place, and time. She has normal strength. No cranial nerve deficit or sensory deficit.   Moving all extremities. No facial assymetry or speech change   Skin: Skin is warm. Capillary refill takes less than 2 seconds. No rash noted.         ED Course   Procedures  Labs Reviewed   CBC WITH DIFFERENTIAL - Abnormal; Notable for the following components:       Result Value    RBC 3.50 (*)     Hgb 9.5 (*)     Hct 28.5 (*)     Platelet 495 (*)     MPV 11.0 (*)     IG# 0.09 (*)     All other components within normal limits   COMPREHENSIVE METABOLIC PANEL - Abnormal; Notable for the following components:    Sodium Level 120 (*)     Chloride 88 (*)     Albumin Level 2.7 (*)     Albumin/Globulin Ratio 0.8 (*)     All other components within normal limits   MAGNESIUM - Abnormal; Notable for the following components:    Magnesium Level 1.30 (*)     All other components within normal limits   URINALYSIS, REFLEX TO URINE CULTURE - Normal    TROPONIN I - Normal   PROTIME-INR - Normal   APTT - Normal        ECG Results              EKG 12-lead (Final result)        Collection Time Result Time QRS Duration OHS QTC Calculation    02/12/24 12:02:40 02/12/24 13:16:50 88 425                     Final result by Interface, Lab In [facility] (02/12/24 13:16:59)                   Narrative:    Test Reason : R42,    Vent. Rate : 070 BPM     Atrial Rate : 070 BPM     P-R Int : 232 ms          QRS Dur : 088 ms      QT Int : 394 ms       P-R-T Axes : 051 -15 000 degrees     QTc Int : 425 ms    Sinus rhythm with 1st degree A-V block  Anteroseptal infarct (cited on or before 12-FEB-2024)  Abnormal ECG  Confirmed by Asher Pak MD ([id number]) on 2/12/2024 1:16:49 PM    Referred By:             Confirmed By:Asher Pak MD                                     EKG 12-LEAD (Final result)  Result time 02/19/24 09:32:34      Final result by Unknown User (02/19/24 09:32:34)                                      Imaging Results              MRI Brain Without Contrast (Final result)  Result time 02/13/24 07:37:22      Final result by Daniel Mercer MD (02/13/24 07:37:22)                   Impression:    Impression:    1. No abnormal signal is identified on the diffusion images to suggest acute infarct.    2. There is a 2.6 x 2.3 cm cystic lesion in the region of the superficial lobe of the right parotid gland, which demonstrates fluid filled level (series 6, image 30). It is partly imaged on the current examination. Correlate clinically as regards further evaluation and followup.    3. Details and other findings as discussed above.    No significant discrepancy with overnight report.      Electronically signed by: Daniel Mercer  Date:    02/13/2024  Time:    07:37               Narrative:      Technique:Multiplanar, multisequence magnetic resonance imaging of the brain was performed without intravenous contrast.    Comparison:Correlation is with CT study dated 2024-02-12  13:23:53.    Clinical history:Tia.    Findings:    Hemorrhage:No acute intracranial hemorrhage is identified.    Stroke:No abnormal signal is identified on the diffusion images to suggest acute infarct.    Extra axial spaces:The ventricles and sulci and basal cisterns all appear  prominent consistent with global cerebral atrophy.    Cerebral, cerebellar, and brainstem parenchyma: Chronic periventricular and subcortical white matter signal abnormalities are seen. The main consideration is small vessel ischemic changes.    Visualized paranasal sinuses:Again noted is opacification of the left posterior ethmoid air cell.    Visualized orbits:The visualized orbits appear unremarkable.    Sella and skull base:Normal.    Miscellaneous:There is a 2.6 x 2.3 cm cystic lesion in the region of the superficial lobe of the right parotid gland, which demonstrates fluid filled level (series 6, image 30).                        Preliminary result by Hayden Trujillo MD (02/12/24 23:17:03)                   Impression:    1. No abnormal signal is identified on the diffusion images to suggest acute infarct.  2. There is a 2.6 x 2.3 cm cystic lesion in the region of the superficial lobe of the right parotid gland, which demonstrates fluid filled level (series 6, image 30). It is partly imaged on the current examination. Correlate clinically as regards further evaluation and followup.  3. Details and other findings as discussed above.               Narrative:    START OF REPORT:  Technique: Multiplanar, multisequence magnetic resonance imaging of the brain was performed without intravenous contrast.    Comparison: Correlation is with CT study dated2024-02-12 13:23:53.    Clinical history: Tia.    Findings:  Hemorrhage: No acute intracranial hemorrhage is identified.  Stroke: No abnormal signal is identified on the diffusion images to suggest acute infarct.  Extra axial spaces: The ventricles and sulci and basal cisterns all appear  mildly prominent consistent with global cerebral atrophy.  Cerebral, cerebellar, and brainstem parenchyma: Moderate periventricular and subcortical white matter signal abnormalities are seen. The main consideration is small vessel ischemic changes.  Visualized paranasal sinuses: Again noted is opacification of the left posterior ethmoid air cell.  Visualized orbits: The visualized orbits appear unremarkable.  Sella and skull base: Normal.    Miscellaneous: There is a 2.6 x 2.3 cm cystic lesion in the region of the superficial lobe of the right parotid gland, which demonstrates fluid filled level (series 6, image 30).                                         CTA Head and Neck (xpd) (Final result)  Result time 02/12/24 17:42:30      Final result by Tiffany Estrada MD (02/12/24 17:42:30)                   Impression:      1. No large vessel occlusion or flow-limiting stenosis.  2. Left upper lobe mass concerning for malignancy.  3. 2.6 cm right parotid nodule.      Electronically signed by: Tiffany Estrada  Date:    02/12/2024  Time:    17:42               Narrative:    EXAMINATION:  CTA HEAD AND NECK (XPD)    CLINICAL HISTORY:  Dizziness, persistent/recurrent, cardiac or vascular cause suspected;    TECHNIQUE:  Axial images obtained through the cervical region and [place] of [place] before and after the administration of intravenous contrast.    Coronal, sagittal, MIP and 3D reconstructions were obtained from the axial data.    Automatic exposure control was utilized to limit radiation dose.    Radiation Dose:    Total DLP: 1403 mGy*cm    COMPARISON:  CT head from the same day    FINDINGS:  Head CT with contrast:    No interval changes when compared to the previous CT.    No enhancing abnormalities.    If present, stenosis of the carotid bulbs is measured based on NASCET criteria,    i.e. area of maximal stenosis compared to the cervical ICA distal to the bulb.    Cervical CTA:    The origins of the great vessels  are patent mild scattered calcifications.    The common carotid arteries, carotid bulbs and internal carotid arteries are patent with mild scattered calcifications.    The vertebral arteries are patent.    Intracranial CTA:    There are mild calcifications along the course of the carotid siphons without significant stenosis.  The middle cerebral arteries and anterior cerebral arteries are patent.    The vertebral arteries, basilar artery and posterior cerebral arteries are patent.    The dural venous sinuses are patent.    Additional findings:    2.6 cm right parotid nodule.    Large left upper lobe mass, for example measuring 4.3 x 5.3 cm in size                                       X-Ray Chest 1 View (Final result)  Result time 02/12/24 15:34:04      Final result by Miriam Tinajero MD (02/12/24 15:34:04)                   Impression:      Persistent left upper lobe mass opacity.      Electronically signed by: Miriam Tinajero  Date:    02/12/2024  Time:    15:34               Narrative:    EXAMINATION:  XR CHEST 1 VIEW    CLINICAL HISTORY:  dizziness;    TECHNIQUE:  Single frontal view of the chest was performed.    COMPARISON:  01/13/2024    FINDINGS:  LINES AND TUBES: Bra straps obscure evaluation.    MEDIASTINUM AND ELEONORA: The cardiac silhouette is normal. Aortic atherosclerosis.    LUNGS: Persistent left apical masslike opacity.    PLEURA:No significant pleural effusion. No pneumothorax.    OTHER: No acute osseous abnormality.                                       CT Head Without Contrast (Final result)  Result time 02/12/24 14:01:29      Final result by Noa Parker MD (02/12/24 14:01:29)                   Impression:      Chronic age-related changes.    Ethmoid sinusitis      Electronically signed by: Lucien Parker  Date:    02/12/2024  Time:    14:01               Narrative:    EXAMINATION:  CT HEAD WITHOUT CONTRAST    CLINICAL HISTORY:  Transient ischemic attack  (TIA);    TECHNIQUE:  Multiple axial images were obtained from the base of the brain to the vertex without contrast administration.  Sagittal and coronal reconstructions were performed..Automatic exposure control is utilized to reduce patient radiation exposure.    COMPARISON:  01/12/2024    FINDINGS:  There is no intracranial mass or lesion seen.  No hemorrhage is seen.  There is a old punctate lacunar infarct in the basal ganglia on the right.  No acute infarct is seen.  There is some cerebral atrophy seen.  There is some compensatory ventricular dilatation and periventricular white matter changes consistent with the patient's age.  Calvarium is intact.  The posterior fossa is unremarkable..  The visualized portions of the paranasal sinuses shows evidence of ethmoid sinusitis.                                       Medications   lactated ringers bolus 1,000 mL (0 mLs Intravenous Stopped 2/12/24 1615)   meclizine tablet 25 mg (25 mg Oral Given 2/12/24 1515)   iopamidoL (ISOVUE-370) injection 100 mL (100 mLs Intravenous Given 2/12/24 1728)   ferric gluconate (FERRLECIT) 125 mg in sodium chloride 0.9% 100 mL IVPB (0 mg Intravenous Stopped 2/13/24 0208)   magnesium sulfate 2g in water 50mL IVPB (premix) (0 g Intravenous Stopped 2/13/24 1059)   iopamidoL (ISOVUE-370) injection 100 mL (100 mLs Intravenous Given 2/13/24 1018)   tolvaptan tablet 15 mg (15 mg Oral Given 2/13/24 1514)     Medical Decision Making  Problems Addressed:  Dizziness: acute illness or injury  Near syncope: acute illness or injury  Recurrent falls: acute illness or injury    Amount and/or Complexity of Data Reviewed  Radiology: ordered. Decision-making details documented in ED Course.    Risk  Prescription drug management.  Decision regarding hospitalization.    Differential diagnosis (includes but is not limited to):   CVA, TIA, ICH, ACS, arrhythmia, electrolyte abnormalities, dehydration, kidney injury, infection, pneumonia, sepsis, viral  URI    MDM Narrative  89-year-old female presents for evaluation of dizziness over the past several days that comminuted gentle fall earlier today that led to her coming to the emergency department for evaluation.  EKG reviewed.  Labs are pending.  Chest x-ray reviewed.  CT scans pending, will also order CTA of the head and neck to evaluate posterior circulation given her dizziness and unsteady gait.  IV fluid rehydration ordered.  Pain and nausea control as needed.  Telemetry monitoring independently reviewed and shows a sinus rhythm with a heart rate in the 70s.  Patient currently hemodynamically stable and stable on room air with no evidence of respiratory distress.    Update:  Labs reviewed, magnesium low, will plan for electrolyte replacement.  Sodium low at 120, likely culprit of the patient's symptoms.  Masslike lesion noted on chest x-ray, patient states this is known.  CTA and CTA reviewed.  Patient to be admitted for further evaluation and management of her symptoms.  Case discussed with hospitalist, will admit.    Dispo: Admit    My independent radiology interpretation: as above  Point of care US (independently performed and interpreted):   Decision rules/clinical scoring:     Sepsis Perfusion Assessment:     Amount and/or Complexity of Data Reviewed  Independent historian: none   Summary of history:   External data reviewed: notes from previous admissions, notes from previous ED visits, and notes from clinic visits  Summary of data reviewed: Prior records reviewed  Risk and benefits of testing: discussed   Labs: ordered and reviewed  Radiology: ordered and independent interpretation performed (see above or ED course)  ECG/medicine tests: ordered and independent interpretation performed (see above or ED course)  Discussion of management or test interpretation with external provider(s): discussed with hospitalist physician   Summary of discussion: as above    Risk  Parenteral controlled substances   Drug  therapy requiring intense monitoring for toxicity   Decision regarding hospitalization  Shared decision making     Critical Care  none    Data Reviewed/Counseling: I have personally reviewed the patient's vital signs, nursing notes, and other relevant tests, information, and imaging. I had a detailed discussion regarding the historical points, exam findings, and any diagnostic results supporting the discharge diagnosis. I personally performed the history, PE, MDM and procedures as documented above and agree with the scribe's documentation.    Portions of this note were dictated using voice recognition software. Although it was reviewed for accuracy, some inherent voice recognition errors may have occurred and may be present in this document.          Scribe Attestation:   Scribe #1: I performed the above scribed service and the documentation accurately describes the services I performed. I attest to the accuracy of the note.    Attending Attestation:           Physician Attestation for Scribe:  Physician Attestation Statement for Scribe #1: I, Jaron Tyson MD, reviewed documentation, as scribed by Ariel Lamb in my presence, and it is both accurate and complete.             ED Course as of 03/04/24 0759 Mon Feb 12, 2024   1541 X-Ray Chest 1 View  Independently visualized/reviewed by me during the ED visit.  - MENDY mass-like opacity, no PTX [MC]   1541 EKG independently interpreted by me.  EKG: SR @ 70, 1st degree AVB, Qtc 425 [MC]      ED Course User Index  [MC] Jaron Tyson MD                           Clinical Impression:  Final diagnoses:  [R42] Dizziness  [R55] Near syncope  [R29.6] Recurrent falls          ED Disposition Condition    Admit Stable                Jaron Tyson MD  03/04/24 0759

## 2024-02-12 NOTE — FIRST PROVIDER EVALUATION
"Medical screening examination initiated.  I have conducted a focused provider triage encounter, findings are as follows:    Brief history of present illness:  89 y/o female presents with continued dizziness especially with walking. Feeling unsteady. Lives alone. Uses walker. Recent fall with face injury (seen for this already)    Vitals:    02/12/24 1157   Pulse: 72   Resp: 16   Temp: 97.9 °F (36.6 °C)   TempSrc: Temporal   SpO2: 97%   Weight: 61.2 kg (135 lb)   Height: 5' 3" (1.6 m)       Pertinent physical exam:  alert, nonlabored, max assist with ambulation     Brief workup plan:  EKG, labs, imaging    Preliminary workup initiated; this workup will be continued and followed by the physician or advanced practice provider that is assigned to the patient when roomed.  "

## 2024-02-13 LAB
ANION GAP SERPL CALC-SCNC: 11 MEQ/L
BASOPHILS # BLD AUTO: 0.03 X10(3)/MCL
BASOPHILS NFR BLD AUTO: 0.3 %
BUN SERPL-MCNC: 14 MG/DL (ref 9.8–20.1)
CALCIUM SERPL-MCNC: 8.9 MG/DL (ref 8.4–10.2)
CHLORIDE SERPL-SCNC: 90 MMOL/L (ref 98–111)
CO2 SERPL-SCNC: 25 MMOL/L (ref 23–31)
CREAT SERPL-MCNC: 0.62 MG/DL (ref 0.55–1.02)
CREAT/UREA NIT SERPL: 23
EOSINOPHIL # BLD AUTO: 0.08 X10(3)/MCL (ref 0–0.9)
EOSINOPHIL NFR BLD AUTO: 0.9 %
ERYTHROCYTE [DISTWIDTH] IN BLOOD BY AUTOMATED COUNT: 14.2 % (ref 11.5–17)
GFR SERPLBLD CREATININE-BSD FMLA CKD-EPI: >60 MLS/MIN/1.73/M2
GLUCOSE SERPL-MCNC: 143 MG/DL (ref 75–121)
HCT VFR BLD AUTO: 30.5 % (ref 37–47)
HGB BLD-MCNC: 9.9 G/DL (ref 12–16)
IMM GRANULOCYTES # BLD AUTO: 0.05 X10(3)/MCL (ref 0–0.04)
IMM GRANULOCYTES NFR BLD AUTO: 0.5 %
LYMPHOCYTES # BLD AUTO: 1.7 X10(3)/MCL (ref 0.6–4.6)
LYMPHOCYTES NFR BLD AUTO: 18.5 %
MCH RBC QN AUTO: 26.4 PG (ref 27–31)
MCHC RBC AUTO-ENTMCNC: 32.5 G/DL (ref 33–36)
MCV RBC AUTO: 81.3 FL (ref 80–94)
MONOCYTES # BLD AUTO: 0.65 X10(3)/MCL (ref 0.1–1.3)
MONOCYTES NFR BLD AUTO: 7.1 %
NEUTROPHILS # BLD AUTO: 6.7 X10(3)/MCL (ref 2.1–9.2)
NEUTROPHILS NFR BLD AUTO: 72.7 %
NRBC BLD AUTO-RTO: 0 %
OSMOLALITY SERPL: 263 MOSM/KG (ref 280–300)
OSMOLALITY UR: 293 MOSM/KG (ref 300–1300)
PLATELET # BLD AUTO: 625 X10(3)/MCL (ref 130–400)
PMV BLD AUTO: 8.2 FL (ref 7.4–10.4)
POTASSIUM SERPL-SCNC: 3.5 MMOL/L (ref 3.5–5.1)
RBC # BLD AUTO: 3.75 X10(6)/MCL (ref 4.2–5.4)
SODIUM SERPL-SCNC: 123 MMOL/L (ref 132–146)
SODIUM SERPL-SCNC: 126 MMOL/L (ref 132–146)
SODIUM SERPL-SCNC: 126 MMOL/L (ref 132–146)
SODIUM UR-SCNC: 72 MMOL/L
WBC # SPEC AUTO: 9.21 X10(3)/MCL (ref 4.5–11.5)

## 2024-02-13 PROCEDURE — 84300 ASSAY OF URINE SODIUM: CPT | Performed by: INTERNAL MEDICINE

## 2024-02-13 PROCEDURE — 80048 BASIC METABOLIC PNL TOTAL CA: CPT | Performed by: INTERNAL MEDICINE

## 2024-02-13 PROCEDURE — 83935 ASSAY OF URINE OSMOLALITY: CPT | Performed by: INTERNAL MEDICINE

## 2024-02-13 PROCEDURE — 25000003 PHARM REV CODE 250: Performed by: INTERNAL MEDICINE

## 2024-02-13 PROCEDURE — 11000001 HC ACUTE MED/SURG PRIVATE ROOM

## 2024-02-13 PROCEDURE — 84295 ASSAY OF SERUM SODIUM: CPT | Performed by: INTERNAL MEDICINE

## 2024-02-13 PROCEDURE — 85025 COMPLETE CBC W/AUTO DIFF WBC: CPT | Performed by: INTERNAL MEDICINE

## 2024-02-13 PROCEDURE — 25500020 PHARM REV CODE 255: Performed by: INTERNAL MEDICINE

## 2024-02-13 PROCEDURE — 63600175 PHARM REV CODE 636 W HCPCS: Performed by: INTERNAL MEDICINE

## 2024-02-13 RX ORDER — MAGNESIUM SULFATE HEPTAHYDRATE 40 MG/ML
4 INJECTION, SOLUTION INTRAVENOUS ONCE
Status: COMPLETED | OUTPATIENT
Start: 2024-02-13 | End: 2024-02-13

## 2024-02-13 RX ORDER — TOLVAPTAN 15 MG/1
15 TABLET ORAL ONCE
Status: COMPLETED | OUTPATIENT
Start: 2024-02-13 | End: 2024-02-13

## 2024-02-13 RX ADMIN — SODIUM CHLORIDE 1000 MG: 1 TABLET ORAL at 09:02

## 2024-02-13 RX ADMIN — BUSPIRONE HYDROCHLORIDE 7.5 MG: 7.5 TABLET ORAL at 10:02

## 2024-02-13 RX ADMIN — IOPAMIDOL 100 ML: 755 INJECTION, SOLUTION INTRAVENOUS at 10:02

## 2024-02-13 RX ADMIN — CLONIDINE HYDROCHLORIDE 0.2 MG: 0.2 TABLET ORAL at 03:02

## 2024-02-13 RX ADMIN — ENOXAPARIN SODIUM 40 MG: 40 INJECTION SUBCUTANEOUS at 05:02

## 2024-02-13 RX ADMIN — SODIUM CHLORIDE 1000 MG: 1 TABLET ORAL at 08:02

## 2024-02-13 RX ADMIN — NIFEDIPINE 30 MG: 30 TABLET, FILM COATED, EXTENDED RELEASE ORAL at 09:02

## 2024-02-13 RX ADMIN — TOLVAPTAN 15 MG: 15 TABLET ORAL at 03:02

## 2024-02-13 RX ADMIN — BUSPIRONE HYDROCHLORIDE 7.5 MG: 7.5 TABLET ORAL at 08:02

## 2024-02-13 RX ADMIN — PANTOPRAZOLE SODIUM 40 MG: 40 TABLET, DELAYED RELEASE ORAL at 09:02

## 2024-02-13 RX ADMIN — METOPROLOL SUCCINATE 50 MG: 50 TABLET, EXTENDED RELEASE ORAL at 09:02

## 2024-02-13 RX ADMIN — LEVOTHYROXINE SODIUM 125 MCG: 125 TABLET ORAL at 06:02

## 2024-02-13 RX ADMIN — BUSPIRONE HYDROCHLORIDE 7.5 MG: 7.5 TABLET ORAL at 03:02

## 2024-02-13 RX ADMIN — ATORVASTATIN CALCIUM 20 MG: 10 TABLET, FILM COATED ORAL at 09:02

## 2024-02-13 RX ADMIN — MAGNESIUM SULFATE HEPTAHYDRATE 4 G: 40 INJECTION, SOLUTION INTRAVENOUS at 08:02

## 2024-02-13 RX ADMIN — SODIUM CHLORIDE 125 MG: 9 INJECTION, SOLUTION INTRAVENOUS at 01:02

## 2024-02-13 RX ADMIN — Medication 6 MG: at 08:02

## 2024-02-13 NOTE — H&P
[facility] Hospital Medicine - H&P Note    Patient Name: Jacques Richmond  MRN: [id number]  PCP: Estrella Moraes NP  Admitting Physician: Natalie Fleming MD  Admission Class: IP- Inpatient   Date of Service: 02/12/2024  Code status: DNR    Chief Complaint   Dizziness    History of Present Illness   This is a 89-year-old female well known to me from recent admission for dizziness/vertigo with medical history of hypertension, T2DM, hypothyroid, anxiety, osteoarthritis present to the ED for recurrent dizziness described as both lightheadedness and vertigo and associated with nausea and high blood pressure and difficulty ambulating/unsteady gait.  She report multiple falls over the past week and hit her head/face on the left side on 1 of the fall resulting in bruising.  She denies cough, fever or chills.  Denies extremity weakness or numbness.  Denies weight loss or night sweats.    On arrival to ED she was afebrile, hypertensive systolic 198/87, saturating 95% on room air.  EKG sinus rhythm with first-degree AV block.  Labs notable for hyponatremia sodium 120, urine osmolarity 293 and urine sodium 72 this is consistent with SIADH.    CT head showed no acute intracranial finding, CTA head and neck show no LVO but incidentally noted partially imaged left upper lobe lung mass measuring about 4.3 x 5.3 cm concerning for malignancy, also noted 2.6 cm right parotid nodule.  MRI brain without contrast show no evidence of infarct.    She received 1 L of LR and meclizine.  Referred to hospital medicine service for further evaluation and management.      ROS   Except as documented, all other systems reviewed and negative     Past Medical History   Hypertension  T2DM  Hypothyroid  Anxiety  GERD  Osteoarthritis    Past Surgical History   Hysterectomy  Cholecystectomy  Appendectomy  Hernia repair  Cataract    Social History   Never smoker, no alcohol or drugs    Family History   Reviewed and  negative    Allergies   Penicillins    Home Medications     Prior to Admission medications    Medication Sig Start Date End Date Taking? Authorizing Provider   acetaminophen (TYLENOL) 500 MG tablet Take 1 tablet (500 mg total) by mouth every 6 (six) hours as needed for Pain. 1/14/24   Inocencio Augustine MD   busPIRone (BUSPAR) 7.5 MG tablet Take 7.5 mg by mouth 3 (three) times daily.    Provider, Historical   docusate sodium (COLACE) 100 MG capsule Take 1 capsule (100 mg total) by mouth 2 (two) times daily. 1/14/24   Inocencio Augustine MD   levothyroxine (SYNTHROID) 125 MCG tablet Take 125 mcg by mouth before breakfast.    Provider, Historical   lisinopriL (PRINIVIL,ZESTRIL) 20 MG tablet Take 20 mg by mouth once daily.    Provider, Historical   metFORMIN (GLUCOPHAGE) 500 MG tablet Take 500 mg by mouth 2 (two) times daily with meals.    Provider, Historical   metoprolol succinate (TOPROL-XL) 50 MG 24 hr tablet Take 50 mg by mouth once daily.    Provider, Historical   omeprazole (PRILOSEC OTC) 20 MG tablet Take 20 mg by mouth once daily.    Provider, Historical   polyethylene glycol (GLYCOLAX) 17 gram PwPk Take 17 g by mouth daily as needed for Constipation. 1/14/24   Inocencio Augustine MD        Physical Exam   Vital Signs  Temp:  [97.7 °F (36.5 °C)-97.9 °F (36.6 °C)]   Pulse:  [65-76]   Resp:  [16-21]   BP: (150-198)/(80-90)   SpO2:  [93 %-98 %]    General: Appears comfortable  HEENT: NC/AT, mild left facial bruises/ecchymosis  Neck:  No JVD  Chest: CTABL  CVS: Regular rhythm. Normal S1/S2.  Abdomen: nondistended, normoactive BS, soft and non-tender.  MSK: No obvious deformity or joint swelling  Skin: Warm and dry  Neuro: AAOx3, no focal neurological deficit  Psych: Cooperative    Labs     Recent Labs     02/12/24  1309   WBC 10.05   RBC 3.50*   HGB 9.5*   HCT 28.5*   MCV 81.4   MCH 27.1   MCHC 33.3   RDW 15.3   *     Recent Labs     02/12/24  1309   PROTIME 14.1   INR 1.1   PTT 31.2      Recent  Labs     02/12/24  1608   *   K 3.7   CHLORIDE 88*   CO2 24   BUN 19.1   CREATININE 0.60   EGFRNORACEVR >60   GLUCOSE 93   CALCIUM 8.4   MG 1.30*   ALBUMIN 2.7*   GLOBULIN 3.5   ALKPHOS 47   ALT 19   AST 24   BILITOT 0.2     Recent Labs     02/12/24  1608   TROPONINI <0.010        Imaging     MRI Brain Without Contrast         CTA Head and Neck (xpd)   Final Result      1. No large vessel occlusion or flow-limiting stenosis.   2. Left upper lobe mass concerning for malignancy.   3. 2.6 cm right parotid nodule.         Electronically signed by: Tiffany Estrada   Date:    02/12/2024   Time:    17:42      X-Ray Chest 1 View   Final Result      Persistent left upper lobe mass opacity.         Electronically signed by: Miriam Tinajero   Date:    02/12/2024   Time:    15:34      CT Head Without Contrast   Final Result      Chronic age-related changes.      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 secondary to lung mass  MENDY Lung mass  Left parotid nodule  Hypertensive urgency  Dizziness - likely related to hypertensive urgency and hyponatremia  Hypomagnesemia    HX HTN, HLD, Hypothyroid, T2DM, Osteoarthritis, anxiety      Plan   CT chest abdomen pelvis W contrast   Fluid restriction 1200 ml/day  NaCl tabs 1g BID  Monitor sodium q.6 hours for 1 day  Continue metoprolol succinate 50mg daily  Start Nifedipine XL 30mg daily  Hold Lasix  Hold ACE-I as it can worsen hyponatremia  Magnesium sulfate 4 g IV  Fall precaution  VTE Prophylaxis: SCDs     Advanced care planning 15 minutes:  I previously discussed code status with the patient during last admission and she wishes to be DNR which she confirmed today as well.  Discussed the finding of left upper lobe lung mass and concern of  malignancy.  She is wishing to undergo diagnostic testing such as CT but does not wish to undergo any biopsy or treatment as as it has currently not causing her any pain or discomfort and pursuing treatment will likely cause more suffering than comfort.  For this reason will not pursue consult for IR, pulmonology or oncology service.  She is willing to meet with palliative care team to plan for home with hospice care.  She already has home health in place.    Natalie Fleming MD  Internal Medicine

## 2024-02-13 NOTE — PROGRESS NOTES
[facility]  Hospital Medicine Progress Note        Chief Complaint: Inpatient Follow-up for Hyponatremia     HPI:   This is a 89-year-old female well known to me from recent admission for dizziness/vertigo with medical history of hypertension, T2DM, hypothyroid, anxiety, osteoarthritis present to the ED for recurrent dizziness described as both lightheadedness and vertigo and associated with nausea and high blood pressure and difficulty ambulating/unsteady gait.  She report multiple falls over the past week and hit her head/face on the left side on 1 of the fall resulting in bruising.  She denies cough, fever or chills.  Denies extremity weakness or numbness.  Denies weight loss or night sweats.     On arrival to ED she was afebrile, hypertensive systolic 198/87, saturating 95% on room air.  EKG sinus rhythm with first-degree AV block.  Labs notable for hyponatremia sodium 120, urine osmolarity 293 and urine sodium 72 this is consistent with SIADH.     CT head showed no acute intracranial finding, CTA head and neck show no LVO but incidentally noted partially imaged left upper lobe lung mass measuring about 4.3 x 5.3 cm concerning for malignancy, also noted 2.6 cm right parotid nodule.  MRI brain without contrast show no evidence of infarct.     She received 1 L of LR and meclizine.  Referred to hospital medicine service for further evaluation and management. Patients sodium was low probably from SIADH. Informed patient about her lung mass and high suspicion for cancer. Discussed goals of care, she wanted to be in DNR status and did not want any aggressive diagnostics to be done for her lung mass. She is open to going home with palliative care.     Interval Hx:   Patient today awake and comfortable. Denies any SOB, chest pain, fever or chills. She is eating well.   No family at bedside.     Case was discussed with patient's nurse and  on the floor.    Objective/physical  exam:  General: In no acute distress, Frail  Chest: Clear to auscultation bilaterally  Heart: RRR, +S1, S2, no appreciable murmur  Abdomen: Soft, nontender, BS +  MSK: Warm, no lower extremity edema, no clubbing or cyanosis  Neurologic: Alert and oriented x4, Cranial nerve II-XII intact, Strength 5/5 in all 4 extremities    VITAL SIGNS: 24 HRS MIN & MAX LAST   Temp  Min: 97.7 °F (36.5 °C)  Max: 98 °F (36.7 °C) 97.9 °F (36.6 °C)   BP  Min: 109/67  Max: 198/87 109/67   Pulse  Min: 65  Max: 84  67   Resp  Min: 16  Max: 21 18   SpO2  Min: 93 %  Max: 98 % 98 %     I have reviewed the following labs:  Recent Labs   Lab 02/12/24  1309   WBC 10.05   RBC 3.50*   HGB 9.5*   HCT 28.5*   MCV 81.4   MCH 27.1   MCHC 33.3   RDW 15.3   *   MPV 11.0*     Recent Labs   Lab 02/12/24  1608 02/12/24  2349 02/13/24  1224   * 123* 126*   K 3.7  --   --    CO2 24  --   --    BUN 19.1  --   --    CREATININE 0.60  --   --    CALCIUM 8.4  --   --    MG 1.30*  --   --    ALBUMIN 2.7*  --   --    ALKPHOS 47  --   --    ALT 19  --   --    AST 24  --   --    BILITOT 0.2  --   --      Microbiology Results (last 7 days)       ** No results found for the last 168 hours. **             See below for Radiology    Scheduled Med:   atorvastatin  20 mg Oral Daily    busPIRone  7.5 mg Oral TID    enoxparin  40 mg Subcutaneous Q24H (prophylaxis, 1700)    levothyroxine  125 mcg Oral Before breakfast    metoprolol succinate  50 mg Oral Daily    NIFEdipine  30 mg Oral Daily    pantoprazole  40 mg Oral Daily    sodium chloride  1,000 mg Oral BID    tolvaptan  15 mg Oral Once      Continuous Infusions:     PRN Meds:  acetaminophen, aluminum-magnesium hydroxide-simethicone, cloNIDine, hydrALAZINE, labetalol, meclizine, melatonin, ondansetron, polyethylene glycol, prochlorperazine, senna-docusate 8.6-50 mg, sodium chloride 0.9%     Assessment/Plan:  Euvolemic Hyponatremia secondary to SIADH, probably paraneoplastic secondary to lung mass  MENDY Lung  mass  Left parotid nodule  Hypertensive urgency  Dizziness - likely related to hypertensive urgency and hyponatremia  Hypomagnesemia     HX HTN, HLD, Hypothyroid, T2DM, Osteoarthritis, anxiety      Plan:  Patient doing well and we discussed about her lung mass and suspicion for cancer  She does not want any aggressive diagnostics or treatment for the lung mass  She is open to going back home with palliative/comfort care   She is functional and has good medical decision making capacity   Sodium now 126, will add samsca 15 mg po x 1 today   BMP in am     Current meds reviewed    Continue strict aspiration, fall and decubitus precautions      Labs in am     VTE prophylaxis: Lovenox     Patient condition:  Fair    Anticipated discharge and Disposition:   Home with palliative/comfort care       All diagnosis and differential diagnosis have been reviewed; assessment and plan has been documented; I have personally reviewed the labs and test results that are presently available; I have reviewed the patients medication list; I have reviewed the consulting providers response and recommendations. I have reviewed or attempted to review medical records based upon their availability    All of the patient's questions have been  addressed and answered. Patient's is agreeable to the above stated plan. I will continue to monitor closely and make adjustments to medical management as needed.  _____________________________________________________________________    Nutrition Status:    Radiology:  I have personally reviewed the following imaging and agree with the radiologist.     CT Chest Abdomen Pelvis With IV Contrast (XPD) NO Oral Contrast  Narrative: EXAMINATION:  CT CHEST ABDOMEN PELVIS WITH IV CONTRAST (XPD)    CLINICAL HISTORY:  Metastatic disease evaluation;    TECHNIQUE:  Low dose axial images, sagittal and coronal reformations were obtained from the thoracic inlet to the pubic symphysis following the IV and oral contrast  administration.  Automatic exposure control is utilized to reduce patient radiation exposure.    COMPARISON:  Recent CTA from 12/12/2024    FINDINGS:  There is a large mass in the left upper lobe of the lung.  It is spiculated.  It measures 5.2 by 5.4 cm.  It is partially necrotic.  It encases some the upper lobe bronchi on the left side..  There is some subcentimeter lymphadenopathy seen the mediastinum.  Reference measurement is made of a lymph node in the precarinal region that measures 7.2 mm in short axis dimension.    Thoracic aorta appears normal in caliber.    There is a small left-sided pleural effusion.  There is mild right lower lobe atelectasis and a small right-sided pleural effusion.    The liver appears normal.  No liver mass or lesion is seen.  Portal and hepatic veins appear normal..    The patient is status post cholecystectomy..    The spleen appears normal.  No splenic mass or lesion is seen.    The pancreas appears grossly unremarkable.  No pancreatic mass or lesion is seen.  No inflammation is seen.    No adrenal abnormality is seen.  No adrenal nodule is seen.    The kidneys are well perfused.  There are some simple cysts seen in the left kidney.  No hydronephrosis is seen.  No hydroureter is seen.  No retroperitoneal abnormality is seen..    Visualized portions of the bowel shows no acute abnormality.  No colitis is seen.  No diverticulitis is seen.  No colonic mass is seen.    No free air is seen.  No free fluid is seen.    Urinary bladder appears unremarkable.    No acute bony abnormality is seen.  Osteoporotic and degenerative changes seen in the bones.  There is a hemangioma at the level of L4.  Impression: Large mass in the left upper lobe of the lung concerning for malignancy    Small left-sided pleural effusion    Simple appearing cyst in the left kidney.    Electronically signed by: Lucien Parker  Date:    02/13/2024  Time:    11:13  MRI Brain Without Contrast  Narrative:  Technique:Multiplanar, multisequence magnetic resonance imaging of the brain was performed without intravenous contrast.    Comparison:Correlation is with CT study dated 2024-02-12 13:23:53.    Clinical history:Tia.    Findings:    Hemorrhage:No acute intracranial hemorrhage is identified.    Stroke:No abnormal signal is identified on the diffusion images to suggest acute infarct.    Extra axial spaces:The ventricles and sulci and basal cisterns all appear  prominent consistent with global cerebral atrophy.    Cerebral, cerebellar, and brainstem parenchyma: Chronic periventricular and subcortical white matter signal abnormalities are seen. The main consideration is small vessel ischemic changes.    Visualized paranasal sinuses:Again noted is opacification of the left posterior ethmoid air cell.    Visualized orbits:The visualized orbits appear unremarkable.    Sella and skull base:Normal.    Miscellaneous:There is a 2.6 x 2.3 cm cystic lesion in the region of the superficial lobe of the right parotid gland, which demonstrates fluid filled level (series 6, image 30).  Impression: Impression:    1. No abnormal signal is identified on the diffusion images to suggest acute infarct.    2. There is a 2.6 x 2.3 cm cystic lesion in the region of the superficial lobe of the right parotid gland, which demonstrates fluid filled level (series 6, image 30). It is partly imaged on the current examination. Correlate clinically as regards further evaluation and followup.    3. Details and other findings as discussed above.    No significant discrepancy with overnight report.    Electronically signed by: Daniel Mercer  Date:    02/13/2024  Time:    07:37      Inocencio Augustine MD   02/13/2024

## 2024-02-13 NOTE — NURSING
Nurses Note -- 4 Eyes      2/13/2024   2:06 AM      Skin assessed during: Admit      [] No Altered Skin Integrity Present    []Prevention Measures Documented      [x] Yes- Altered Skin Integrity Present or Discovered   [x] LDA Added if Not in Epic (Describe Wound)   [x] New Altered Skin Integrity was Present on Admit and Documented in LDA   [x] Wound Image Taken    Wound Care Consulted? No    Attending Nurse:  Rita Chambers LPN    Second RN/Staff Member:   Sanjuana Tapia LPN

## 2024-02-14 VITALS
TEMPERATURE: 99 F | HEART RATE: 80 BPM | BODY MASS INDEX: 22.86 KG/M2 | OXYGEN SATURATION: 98 % | DIASTOLIC BLOOD PRESSURE: 71 MMHG | SYSTOLIC BLOOD PRESSURE: 148 MMHG | WEIGHT: 129 LBS | HEIGHT: 63 IN | RESPIRATION RATE: 18 BRPM

## 2024-02-14 LAB
ANION GAP SERPL CALC-SCNC: 7 MEQ/L
BUN SERPL-MCNC: 11.2 MG/DL (ref 9.8–20.1)
CALCIUM SERPL-MCNC: 8.4 MG/DL (ref 8.4–10.2)
CHLORIDE SERPL-SCNC: 99 MMOL/L (ref 98–111)
CO2 SERPL-SCNC: 24 MMOL/L (ref 23–31)
CREAT SERPL-MCNC: 0.58 MG/DL (ref 0.55–1.02)
CREAT/UREA NIT SERPL: 19
GFR SERPLBLD CREATININE-BSD FMLA CKD-EPI: >60 MLS/MIN/1.73/M2
GLUCOSE SERPL-MCNC: 99 MG/DL (ref 75–121)
POTASSIUM SERPL-SCNC: 4.2 MMOL/L (ref 3.5–5.1)
SODIUM SERPL-SCNC: 130 MMOL/L (ref 132–146)

## 2024-02-14 PROCEDURE — 80048 BASIC METABOLIC PNL TOTAL CA: CPT | Performed by: INTERNAL MEDICINE

## 2024-02-14 PROCEDURE — 25000003 PHARM REV CODE 250: Performed by: INTERNAL MEDICINE

## 2024-02-14 RX ORDER — SODIUM CHLORIDE 1 G/1
1000 TABLET ORAL 2 TIMES DAILY
Qty: 20 TABLET | Refills: 0 | Status: SHIPPED | OUTPATIENT
Start: 2024-02-14 | End: 2024-02-24

## 2024-02-14 RX ADMIN — NIFEDIPINE 30 MG: 30 TABLET, FILM COATED, EXTENDED RELEASE ORAL at 09:02

## 2024-02-14 RX ADMIN — PANTOPRAZOLE SODIUM 40 MG: 40 TABLET, DELAYED RELEASE ORAL at 09:02

## 2024-02-14 RX ADMIN — ATORVASTATIN CALCIUM 20 MG: 10 TABLET, FILM COATED ORAL at 09:02

## 2024-02-14 RX ADMIN — SODIUM CHLORIDE 1000 MG: 1 TABLET ORAL at 09:02

## 2024-02-14 RX ADMIN — LEVOTHYROXINE SODIUM 125 MCG: 125 TABLET ORAL at 05:02

## 2024-02-14 RX ADMIN — METOPROLOL SUCCINATE 50 MG: 50 TABLET, EXTENDED RELEASE ORAL at 09:02

## 2024-02-14 RX ADMIN — BUSPIRONE HYDROCHLORIDE 7.5 MG: 7.5 TABLET ORAL at 09:02

## 2024-02-14 NOTE — DISCHARGE SUMMARY
[facility] Medicine Discharge Summary    Admit Date: 2/12/2024  Discharge Date and Time: 2/14/202411:48 AM  Admitting Physician: NENA Team  Discharging Physician: Inocencio Augustine MD.  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  She report multiple falls over the past week and hit her head/face on the left side on 1 of the fall resulting in bruising.  She denies cough, fever or chills.  Denies extremity weakness or numbness.  Denies weight loss or night sweats.     On arrival to ED she was afebrile, hypertensive systolic 198/87, saturating 95% on room air.  EKG sinus rhythm with first-degree AV block.  Labs notable for hyponatremia sodium 120, urine osmolarity 293 and urine sodium 72 this is consistent with SIADH.     CT head showed no acute intracranial finding, CTA head and neck show no LVO but incidentally noted partially imaged left upper lobe lung mass measuring about 4.3 x 5.3 cm concerning for malignancy, also noted 2.6 cm right parotid nodule.  MRI brain without contrast show no evidence of infarct.     She received 1 L of LR and meclizine.  Referred to hospital medicine service for further evaluation and management.     Patients sodium was low probably from SIADH. Informed patient  about her lung mass and high suspicion for cancer. Discussed goals of care, she wanted to be in DNR status and did not want any aggressive diagnostics to be done for her lung mass. She is open to going home with palliative care/hospice care. She was placed on slat tabs and one dose of samasca was also given. Her sodium level improved, she was stable and asymptomatic. We stopped her home lasix.     Discussed dc plan with patient and family. They are ok with home with palliative/comfort care. She will be discharged home today.     Pt was seen and examined on the day of discharge  Vitals:  VITAL SIGNS: 24 HRS MIN & MAX LAST   Temp  Min: 97.8 °F (36.6 °C)  Max: 98.7 °F (37.1 °C) 98.7 °F (37.1 °C)   BP  Min: 123/66  Max: 138/73 129/77   Pulse  Min: 74  Max: 82  78   Resp  Min: 18  Max: 20 18   SpO2  Min: 95 %  Max: 99 % 98 %       Physical Exam:  Heart RRR  Lungs clear   Abdomen soft and non tender   Neuro: No FND      Procedures Performed: No admission procedures for hospital encounter.     Significant Diagnostic Studies: See Full reports for all details    Recent Labs   Lab 02/12/24  1309 02/13/24  1323   WBC 10.05 9.21   RBC 3.50* 3.75*   HGB 9.5* 9.9*   HCT 28.5* 30.5*   MCV 81.4 81.3   MCH 27.1 26.4*   MCHC 33.3 32.5*   RDW 15.3 14.2   * 625*   MPV 11.0* 8.2       Recent Labs   Lab 02/12/24  1608 02/12/24  2349 02/13/24  1224 02/13/24  1323 02/14/24  0501   *   < > 126* 126* 130*   K 3.7  --   --  3.5 4.2   CO2 24  --   --  25 24   BUN 19.1  --   --  14.0 11.2   CREATININE 0.60  --   --  0.62 0.58   CALCIUM 8.4  --   --  8.9 8.4   MG 1.30*  --   --   --   --    ALBUMIN 2.7*  --   --   --   --    ALKPHOS 47  --   --   --   --    ALT 19  --   --   --   --    AST 24  --   --   --   --    BILITOT 0.2  --   --   --   --     < > = values in this interval not displayed.        Microbiology Results (last 7 days)       ** No results found for the last 168 hours. **             CT Chest Abdomen Pelvis With IV  Contrast (XPD) NO Oral Contrast  Narrative: EXAMINATION:  CT CHEST ABDOMEN PELVIS WITH IV CONTRAST (XPD)    CLINICAL HISTORY:  Metastatic disease evaluation;    TECHNIQUE:  Low dose axial images, sagittal and coronal reformations were obtained from the thoracic inlet to the pubic symphysis following the IV and oral contrast administration.  Automatic exposure control is utilized to reduce patient radiation exposure.    COMPARISON:  Recent CTA from 12/12/2024    FINDINGS:  There is a large mass in the left upper lobe of the lung.  It is spiculated.  It measures 5.2 by 5.4 cm.  It is partially necrotic.  It encases some the upper lobe bronchi on the left side..  There is some subcentimeter lymphadenopathy seen the mediastinum.  Reference measurement is made of a lymph node in the precarinal region that measures 7.2 mm in short axis dimension.    Thoracic aorta appears normal in caliber.    There is a small left-sided pleural effusion.  There is mild right lower lobe atelectasis and a small right-sided pleural effusion.    The liver appears normal.  No liver mass or lesion is seen.  Portal and hepatic veins appear normal..    The patient is status post cholecystectomy..    The spleen appears normal.  No splenic mass or lesion is seen.    The pancreas appears grossly unremarkable.  No pancreatic mass or lesion is seen.  No inflammation is seen.    No adrenal abnormality is seen.  No adrenal nodule is seen.    The kidneys are well perfused.  There are some simple cysts seen in the left kidney.  No hydronephrosis is seen.  No hydroureter is seen.  No retroperitoneal abnormality is seen..    Visualized portions of the bowel shows no acute abnormality.  No colitis is seen.  No diverticulitis is seen.  No colonic mass is seen.    No free air is seen.  No free fluid is seen.    Urinary bladder appears unremarkable.    No acute bony abnormality is seen.  Osteoporotic and degenerative changes seen in the bones.  There is a  hemangioma at the level of L4.  Impression: Large mass in the left upper lobe of the lung concerning for malignancy    Small left-sided pleural effusion    Simple appearing cyst in the left kidney.    Electronically signed by: Lucien Parker  Date:    02/13/2024  Time:    11:13  MRI Brain Without Contrast  Narrative: Technique:Multiplanar, multisequence magnetic resonance imaging of the brain was performed without intravenous contrast.    Comparison:Correlation is with CT study dated 2024-02-12 13:23:53.    Clinical history:Tia.    Findings:    Hemorrhage:No acute intracranial hemorrhage is identified.    Stroke:No abnormal signal is identified on the diffusion images to suggest acute infarct.    Extra axial spaces:The ventricles and sulci and basal cisterns all appear  prominent consistent with global cerebral atrophy.    Cerebral, cerebellar, and brainstem parenchyma: Chronic periventricular and subcortical white matter signal abnormalities are seen. The main consideration is small vessel ischemic changes.    Visualized paranasal sinuses:Again noted is opacification of the left posterior ethmoid air cell.    Visualized orbits:The visualized orbits appear unremarkable.    Sella and skull base:Normal.    Miscellaneous:There is a 2.6 x 2.3 cm cystic lesion in the region of the superficial lobe of the right parotid gland, which demonstrates fluid filled level (series 6, image 30).  Impression: Impression:    1. No abnormal signal is identified on the diffusion images to suggest acute infarct.    2. There is a 2.6 x 2.3 cm cystic lesion in the region of the superficial lobe of the right parotid gland, which demonstrates fluid filled level (series 6, image 30). It is partly imaged on the current examination. Correlate clinically as regards further evaluation and followup.    3. Details and other findings as discussed above.    No significant discrepancy with overnight report.    Electronically signed by: Daniel  Mercer  Date:    02/13/2024  Time:    07:37         Medication List        START taking these medications      sodium chloride 1,000 mg Tbso oral tablet  Take 1 tablet (1,000 mg total) by mouth 2 (two) times daily. for 10 days            CONTINUE taking these medications      acetaminophen 500 MG tablet  Commonly known as: TYLENOL  Take 1 tablet (500 mg total) by mouth every 6 (six) hours as needed for Pain.     atorvastatin 20 MG tablet  Commonly known as: LIPITOR     busPIRone 7.5 MG tablet  Commonly known as: BUSPAR     docusate sodium 100 MG capsule  Commonly known as: COLACE  Take 1 capsule (100 mg total) by mouth 2 (two) times daily.     levothyroxine 125 MCG tablet  Commonly known as: SYNTHROID     lisinopriL 30 MG tablet  Commonly known as: PRINIVIL,ZESTRIL     meloxicam 15 MG tablet  Commonly known as: MOBIC     metFORMIN 500 MG tablet  Commonly known as: GLUCOPHAGE     metoprolol succinate 50 MG 24 hr tablet  Commonly known as: TOPROL-XL     omeprazole 20 MG tablet  Commonly known as: PRILOSEC OTC     polyethylene glycol 17 gram Pwpk  Commonly known as: GLYCOLAX  Take 17 g by mouth daily as needed for Constipation.            STOP taking these medications      furosemide 20 MG tablet  Commonly known as: LASIX               Where to Get Your Medications        These medications were sent to [id number] at [address]  [address] [facility] [address]      Phone: [phone]   sodium chloride 1,000 mg Tbso oral tablet          Explained in detail to the patient about the discharge plan, medications, and follow-up visits. Pt understands and agrees with the treatment plan  Discharge Disposition: Home-Health Care [facility]   Discharged Condition: stable  Diet-   Dietary Orders (From admission, onward)       Start     Ordered    02/12/24 2313  Diet heart healthy Fluid - 1200mL  (Diet/Nutrition [facility])  Diet effective now        Question:   Fluid restriction:  Answer:  Fluid - 1200mL    02/12/24 2312                   Medications Per DC med rec  Activities as tolerated    For further questions contact hospitalist office    Discharge time 33 minutes    For worsening symptoms, chest pain, shortness of breath, increased abdominal pain, high grade fever, stroke or stroke like symptoms, immediately go to the nearest Emergency Room or call 911 as soon as possible.      Inocencio Mckeon M.D on 2/14/2024. at 11:48 AM.

## 2024-02-14 NOTE — PLAN OF CARE
Patient is discharged with hospice information . Patient is active with [facility] home [facility]. CM sent updates to [facility] via care port. Patient's family would like home  hospice care with [facility]. CM printed choice list for Home Hospice care . Patient's daughters Florence Segura and Shirley Peña would like to us [facility] hospice . Referral for Home Hospice sent to [facility] . Clinicals sent. DC order /Summary/AVS sent via care port per CM.        Hospice order sent to [facility] home [facility]osp[facility] via Care port per CM

## 2024-02-14 NOTE — PLAN OF CARE
Problem: Adult Inpatient Plan of Care  Goal: Optimal Comfort and Wellbeing  Outcome: Ongoing, Progressing     Problem: Impaired Wound Healing  Goal: Optimal Wound Healing  Outcome: Ongoing, Progressing     Problem: Skin Injury Risk Increased  Goal: Skin Health and Integrity  Outcome: Ongoing, Progressing     Problem: Fall Injury Risk  Goal: Absence of Fall and Fall-Related Injury  Outcome: Ongoing, Progressing     Problem: Coping Ineffective  Goal: Effective Coping  Outcome: Ongoing, Progressing

## 2024-02-14 NOTE — PLAN OF CARE
02/14/24 1459   Discharge Assessment   Assessment Type Discharge Planning Assessment   Source of Information patient;family  (daughter Florence Segura)   When was your last doctors appointment?   (PCP Jenna Moraes, NP)   Communicated MORA with patient/caregiver Date not available/Unable to determine   Reason For Admission Hyponatremia, Dizziness   People in Home sibling(s)  (Lives with sister Florence Segura)   Do you expect to return to your current living situation? Yes   Do you have help at home or someone to help you manage your care at home? Yes   Who are your caregiver(s) and their phone number(s)? Florence Segura [phone]   Prior to hospitilization cognitive status: Alert/Oriented   Current cognitive status: Alert/Oriented   Walking or Climbing Stairs Difficulty   (walker)   Dressing/Bathing Difficulty no  (sister helps and T-VIPS)   Do you have any problems with:   (sister helps)   Home Layout Able to live on 1st floor   Equipment Currently Used at Home walker, rolling;walker, joseph   Readmission within 30 days? Yes   Patient currently being followed by outpatient case management? No   Do you currently have service(s) that help you manage your care at home? Yes   Name and Contact number of agency [facility]   Is the pt/caregiver preference to resume services with current agency Yes   Do you take prescription medications? Yes   Do you have prescription coverage? Yes   Coverage Medicare [place]/ [place] of [place]   Do you have any problems affording any of your prescribed medications? No   Is the patient taking medications as prescribed? yes   Who is going to help you get home at discharge? Sisters Florence and Shirley   How do you get to doctors appointments? family or friend will provide   Are you on dialysis? No   Do you take coumadin? No   Discharge Plan A Home Health;Home with family;Home   Discharge Plan B Home with family;Home Health;Hospice/home;Home   DME Needed Upon Discharge  none   Transition of Care  Barriers None   Physical Activity   On average, how many days per week do you engage in moderate to strenuous exercise (like a brisk walk)? 0 days   On average, how many minutes do you engage in exercise at this level? 0 min   Financial Resource Strain   How hard is it for you to pay for the very basics like food, housing, medical care, and heating? Not hard   Housing Stability   In the last 12 months, was there a time when you were not able to pay the mortgage or rent on time? N   In the last 12 months, how many places have you lived? 1   In the last 12 months, was there a time when you did not have a steady place to sleep or slept in a shelter (including now)? N   Transportation Needs   In the past 12 months, has lack of transportation kept you from medical appointments or from getting medications? no   In the past 12 months, has lack of transportation kept you from meetings, work, or from getting things needed for daily living? No   Food Insecurity   Within the past 12 months, you worried that your food would run out before you got the money to buy more. Never true   Within the past 12 months, the food you bought just didn't last and you didn't have money to get more. Never true   Stress   Do you feel stress - tense, restless, nervous, or anxious, or unable to sleep at night because your mind is troubled all the time - these days? Only a littl   Social Connections   In a typical week, how many times do you talk on the phone with family, friends, or neighbors? More than 3   How often do you get together with friends or relatives? More than 3   How often do you attend Bahai or Caodaism services? Never   Do you belong to any clubs or organizations such as Bahai groups, unions, fraternal or athletic groups, or school groups? No   How often do you attend meetings of the clubs or organizations you belong to? Never   Are you , , , , never , or living with a partner?     Alcohol Use   Q1: How often do you have a drink containing alcohol? Never   Q2: How many drinks containing alcohol do you have on a typical day when you are drinking? None   Q3: How often do you have six or more drinks on one occasion? Never   OTHER   Name(s) of People in Home Florence Segura

## 2024-02-14 NOTE — NURSING
Patient transferred from room 776 to 788 for fall prevention/safety. Observed with increased confusion and anxiety prior to relocation. Voiced to nurse that she wanted to talk to her neighbor and that she was being held hostage. Verbalized that she needed to be able to get in her bed. Redirected repeated times with no success. Call placed to daughter Florence to notify her of room change with no answer. Bed alarm on. Call bell in patient's reach.

## 2024-04-24 ENCOUNTER — LAB REQUISITION (OUTPATIENT)
Dept: LAB | Facility: HOSPITAL | Age: 89
End: 2024-04-24
Payer: MEDICARE

## 2024-04-24 DIAGNOSIS — E78.5 HYPERLIPIDEMIA, UNSPECIFIED: ICD-10-CM

## 2024-04-24 DIAGNOSIS — E11.9 TYPE 2 DIABETES MELLITUS WITHOUT COMPLICATIONS: ICD-10-CM

## 2024-04-24 DIAGNOSIS — I11.0 HYPERTENSIVE HEART DISEASE WITH HEART FAILURE: ICD-10-CM

## 2024-04-24 DIAGNOSIS — D50.9 IRON DEFICIENCY ANEMIA, UNSPECIFIED: ICD-10-CM

## 2024-04-24 DIAGNOSIS — E83.42 HYPOMAGNESEMIA: ICD-10-CM

## 2024-04-24 DIAGNOSIS — E03.9 HYPOTHYROIDISM, UNSPECIFIED: ICD-10-CM

## 2024-04-24 LAB
ALBUMIN SERPL-MCNC: 2.7 G/DL (ref 3.4–4.8)
ALBUMIN/GLOB SERPL: 0.7 RATIO (ref 1.1–2)
ALP SERPL-CCNC: 52 UNIT/L (ref 40–150)
ALT SERPL-CCNC: 20 UNIT/L (ref 0–55)
AST SERPL-CCNC: 20 UNIT/L (ref 5–34)
BASOPHILS # BLD AUTO: 0.04 X10(3)/MCL
BASOPHILS NFR BLD AUTO: 0.3 %
BILIRUB SERPL-MCNC: 0.2 MG/DL
BNP BLD-MCNC: 226.6 PG/ML
BUN SERPL-MCNC: 32.9 MG/DL (ref 9.8–20.1)
CALCIUM SERPL-MCNC: 8.4 MG/DL (ref 8.4–10.2)
CHLORIDE SERPL-SCNC: 95 MMOL/L (ref 98–111)
CO2 SERPL-SCNC: 24 MMOL/L (ref 23–31)
CREAT SERPL-MCNC: 0.77 MG/DL (ref 0.55–1.02)
EOSINOPHIL # BLD AUTO: 0.03 X10(3)/MCL (ref 0–0.9)
EOSINOPHIL NFR BLD AUTO: 0.2 %
ERYTHROCYTE [DISTWIDTH] IN BLOOD BY AUTOMATED COUNT: 15.4 % (ref 11.5–17)
FERRITIN SERPL-MCNC: 154.5 NG/ML (ref 4.63–204)
GFR SERPLBLD CREATININE-BSD FMLA CKD-EPI: >60 MLS/MIN/1.73/M2
GLOBULIN SER-MCNC: 3.7 GM/DL (ref 2.4–3.5)
GLUCOSE SERPL-MCNC: 117 MG/DL (ref 75–121)
HCT VFR BLD AUTO: 27.2 % (ref 37–47)
HGB BLD-MCNC: 8.9 G/DL (ref 12–16)
IMM GRANULOCYTES # BLD AUTO: 0.04 X10(3)/MCL (ref 0–0.04)
IMM GRANULOCYTES NFR BLD AUTO: 0.3 %
IRON SATN MFR SERPL: 7 % (ref 20–50)
IRON SERPL-MCNC: 14 UG/DL (ref 50–170)
LYMPHOCYTES # BLD AUTO: 1.45 X10(3)/MCL (ref 0.6–4.6)
LYMPHOCYTES NFR BLD AUTO: 11.7 %
MAGNESIUM SERPL-MCNC: 1.5 MG/DL (ref 1.6–2.6)
MCH RBC QN AUTO: 24.9 PG (ref 27–31)
MCHC RBC AUTO-ENTMCNC: 32.7 G/DL (ref 33–36)
MCV RBC AUTO: 76 FL (ref 80–94)
MONOCYTES # BLD AUTO: 0.7 X10(3)/MCL (ref 0.1–1.3)
MONOCYTES NFR BLD AUTO: 5.6 %
NEUTROPHILS # BLD AUTO: 10.18 X10(3)/MCL (ref 2.1–9.2)
NEUTROPHILS NFR BLD AUTO: 81.9 %
NRBC BLD AUTO-RTO: 0 %
PLATELET # BLD AUTO: 722 X10(3)/MCL (ref 130–400)
PMV BLD AUTO: 9 FL (ref 7.4–10.4)
POTASSIUM SERPL-SCNC: 4.5 MMOL/L (ref 3.5–5.1)
PREALB SERPL-MCNC: 12.3 MG/DL (ref 14–37)
PROT SERPL-MCNC: 6.4 GM/DL (ref 5.8–7.6)
RBC # BLD AUTO: 3.58 X10(6)/MCL (ref 4.2–5.4)
SODIUM SERPL-SCNC: 129 MMOL/L (ref 132–146)
TIBC SERPL-MCNC: 182 UG/DL (ref 70–310)
TIBC SERPL-MCNC: 196 UG/DL (ref 250–450)
TRANSFERRIN SERPL-MCNC: 186 MG/DL
URATE SERPL-MCNC: 3.6 MG/DL (ref 2.6–6)
WBC # SPEC AUTO: 12.44 X10(3)/MCL (ref 4.5–11.5)

## 2024-04-24 PROCEDURE — 82728 ASSAY OF FERRITIN: CPT | Performed by: NURSE PRACTITIONER

## 2024-04-24 PROCEDURE — 83540 ASSAY OF IRON: CPT | Performed by: NURSE PRACTITIONER

## 2024-04-24 PROCEDURE — 80053 COMPREHEN METABOLIC PANEL: CPT | Performed by: NURSE PRACTITIONER

## 2024-04-24 PROCEDURE — 84550 ASSAY OF BLOOD/URIC ACID: CPT | Performed by: NURSE PRACTITIONER

## 2024-04-24 PROCEDURE — 83735 ASSAY OF MAGNESIUM: CPT | Performed by: NURSE PRACTITIONER

## 2024-04-24 PROCEDURE — 84443 ASSAY THYROID STIM HORMONE: CPT | Performed by: NURSE PRACTITIONER

## 2024-04-24 PROCEDURE — 83880 ASSAY OF NATRIURETIC PEPTIDE: CPT | Performed by: NURSE PRACTITIONER

## 2024-04-24 PROCEDURE — 85025 COMPLETE CBC W/AUTO DIFF WBC: CPT | Performed by: NURSE PRACTITIONER

## 2024-04-24 PROCEDURE — 84134 ASSAY OF PREALBUMIN: CPT | Performed by: NURSE PRACTITIONER

## 2024-04-25 LAB — MAYO GENERIC ORDERABLE RESULT: NORMAL

## 2024-05-02 ENCOUNTER — HOSPITAL ENCOUNTER (EMERGENCY)
Facility: HOSPITAL | Age: 89
Discharge: HOME | End: 2024-05-02
Attending: EMERGENCY MEDICINE
Payer: MEDICARE

## 2024-05-02 VITALS
WEIGHT: 124 LBS | TEMPERATURE: 98 F | OXYGEN SATURATION: 97 % | HEART RATE: 77 BPM | RESPIRATION RATE: 18 BRPM | DIASTOLIC BLOOD PRESSURE: 70 MMHG | HEIGHT: 63 IN | BODY MASS INDEX: 21.97 KG/M2 | SYSTOLIC BLOOD PRESSURE: 151 MMHG
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
## 2024-05-02 DIAGNOSIS — S22.070A CLOSED WEDGE COMPRESSION FRACTURE OF T9 VERTEBRA, INITIAL ENCOUNTER: Primary | ICD-10-CM

## 2024-05-02 PROCEDURE — 99284 EMERGENCY DEPT VISIT MOD MDM: CPT | Mod: 25

## 2024-05-02 PROCEDURE — 25000003 PHARM REV CODE 250: Performed by: EMERGENCY MEDICINE

## 2024-05-02 PROCEDURE — 99284 EMERGENCY DEPT VISIT MOD MDM: CPT | Mod: ,,, | Performed by: NEUROLOGICAL SURGERY

## 2024-05-02 RX ORDER — HYDROCODONE BITARTRATE AND ACETAMINOPHEN 7.5; 325 MG/15ML; MG/15ML
5 SOLUTION ORAL
Status: COMPLETED | OUTPATIENT
Start: 2024-05-02 | End: 2024-05-02

## 2024-05-02 RX ORDER — HYDROCODONE BITARTRATE AND ACETAMINOPHEN 7.5; 325 MG/15ML; MG/15ML
5 SOLUTION ORAL EVERY 6 HOURS PRN
Qty: 100 ML | Refills: 0 | Status: ON HOLD | OUTPATIENT
Start: 2024-05-02 | End: 2024-05-13 | Stop reason: HOSPADM

## 2024-05-02 RX ORDER — HYDROCODONE BITARTRATE AND ACETAMINOPHEN 5; 325 MG/1; MG/1
0.5 TABLET ORAL EVERY 6 HOURS PRN
Qty: 10 TABLET | Refills: 0 | Status: SHIPPED | OUTPATIENT
Start: 2024-05-02 | End: 2024-05-02 | Stop reason: SDUPTHER

## 2024-05-02 RX ADMIN — HYDROCODONE BITARTRATE AND ACETAMINOPHEN 5 ML: 7.5; 325 SOLUTION ORAL at 10:05

## 2024-05-02 NOTE — CONSULTS
NEUROSURGICAL INPATIENT CONSULTATION NOTE    DATE OF SERVICE:  05/02/2024    ATTENDING PHYSICIAN:  Hunter Starkey D.O.    CONSULT REQUESTED BY:  ED    REASON FOR CONSULT:  Compression fx    HISTORY OF PRESENT ILLNESS:  89 y.o. female who p/w after fall from standing onto her buttocks and found to have T9 compression fx.  She reports thoracic back pain that radiates to her sides but able to ambulate at her baseline.  She also has lung mass for which she is refusing treatment.  She would like to go to home and is refusing further imaging.              PAST MEDICAL HISTORY:  Active Ambulatory Problems     Diagnosis Date Noted    Hyponatremia 01/13/2024    Dizziness 01/13/2024    Hypertension 01/13/2024    Mass of upper lobe of left lung 02/12/2024     Resolved Ambulatory Problems     Diagnosis Date Noted    No Resolved Ambulatory Problems     No Additional Past Medical History       PAST SURGICAL HISTORY:  No past surgical history on file.    SOCIAL HISTORY:   Social History     Socioeconomic History    Marital status:    Tobacco Use    Smoking status: Never    Smokeless tobacco: Never     Social Determinants of Health     Financial Resource Strain: Low Risk  (2/14/2024)    Overall Financial Resource Strain (CARDIA)     Difficulty of Paying Living Expenses: Not hard at all   Food Insecurity: No Food Insecurity (2/14/2024)    Hunger Vital Sign     Worried About Running Out of Food in the Last Year: Never true     Ran Out of Food in the Last Year: Never true   Transportation Needs: No Transportation Needs (2/14/2024)    PRAPARE - Transportation     Lack of Transportation (Medical): No     Lack of Transportation (Non-Medical): No   Physical Activity: Inactive (2/14/2024)    Exercise Vital Sign     Days of Exercise per Week: 0 days     Minutes of Exercise per Session: 0 min   Stress: No Stress Concern Present (2/14/2024)    [employer] - Occupational Stress Questionnaire     Feeling of  Stress : Only a little   Housing Stability: Low Risk  (2/14/2024)    Housing Stability Vital Sign     Unable to Pay for Housing in the Last Year: No     Number of Places Lived in the Last Year: 1     Unstable Housing in the Last Year: No       FAMILY HISTORY:  No family history on file.    CURRENTS MEDICATIONS:    No current facility-administered medications on file prior to encounter.     Current Outpatient Medications on File Prior to Encounter   Medication Sig Dispense Refill    acetaminophen (TYLENOL) 500 MG tablet Take 1 tablet (500 mg total) by mouth every 6 (six) hours as needed for Pain.  0    atorvastatin (LIPITOR) 20 MG tablet Take 20 mg by mouth once daily.      busPIRone (BUSPAR) 7.5 MG tablet Take 7.5 mg by mouth 3 (three) times daily.      docusate sodium (COLACE) 100 MG capsule Take 1 capsule (100 mg total) by mouth 2 (two) times daily.  0    levothyroxine (SYNTHROID) 125 MCG tablet Take 125 mcg by mouth before breakfast.      lisinopriL (PRINIVIL,ZESTRIL) 30 MG tablet Take 30 mg by mouth once daily.      meloxicam (MOBIC) 15 MG tablet Take 15 mg by mouth once daily.      metFORMIN (GLUCOPHAGE) 500 MG tablet Take 500 mg by mouth 2 (two) times daily with meals.      metoprolol succinate (TOPROL-XL) 50 MG 24 hr tablet Take 50 mg by mouth once daily.      omeprazole (PRILOSEC OTC) 20 MG tablet Take 20 mg by mouth once daily.      polyethylene glycol (GLYCOLAX) 17 gram PwPk Take 17 g by mouth daily as needed for Constipation.  0           ALLERGIES:  Review of patient's allergies indicates:   Allergen Reactions    Penicillins Itching     rash       REVIEW OF SYSTEMS:      PHYSICAL EXAMINATION:   Vitals:    05/02/24 0700   BP: (!) 165/75   Pulse: 75   Resp: (!) 23   Temp: 98 °F (36.7 °C)       Neurosurgery Physical Exam    Physical Exam:  Constitutional: Patient sitting comfortably in chair. Appears well developed and well nourished.  Skin: Exposed areas are intact without abnormal markings, rashes or  "other lesions.  HEENT: Normocephalic. Normal conjunctivae.  Cardiovascular: Normal rate and regular rhythm.  Respiratory: Chest wall rises and falls symmetrically, without signs of respiratory distress.  Abdomen: Soft and non-tender.  Extremities: Warm and without edema. Calves supple, non-tender.  Psych/Behavior: Normal affect.    Neurological:    Mental status: Alert and oriented. Conversational and appropriate.       Cranial Nerves: VFF to confrontation. PERRL. EOMI without nystagmus. Facial STLT normal and symmetric. Strong, symmetric muscles of mastication. Facial strength full and symmetric. Hearing equal bilaterally to finger rub. Palate and uvula rise and fall normally in midline. Shoulder shrug 5/5 strength. Tongue midline.     Motor:    Upper:  Deltoids Triceps Biceps WE WF  FA    R 5/5 5/5 5/5 5/5 5/5 5/5 5/5    L 5/5 5/5 5/5 5/5 5/5 5/5 5/5      Lower:  HF KE KF DF PF EHL    R 5/5 5/5 5/5 5/5 5/5 5/5    L 5/5 5/5 5/5 5/5 5/5 5/5     Sensory: Intact sensation to light touch and pinprick in all extremities.     Reflexes:      DTR: 2+ knees and biceps symmetrically.     Osuna's: Negative.     Babinski's: Negative.     Clonus: Negative.    Cerebellar: Finger-to-nose and rapid alternating movements normal.     Gait:  Stable, fluid.  DIAGNOSTIC DATA:    No results for input(s): "HGB", "HCT", "MCV", "WBC", "PLT", "NA", "K", "CL", "GLU", "BUN", "CREATININE", "CALCIUM", "MG", "ALT", "AST", "ALBUMIN", "BILITOT", "INR" in the last 72 hours.    Microbiology Results (last 7 days)       ** No results found for the last 168 hours. **            IMAGING:  I personally reviewed the following imaging:    T-L xray, 5/2/24:  1. T9 compression fx with significant JOSE  2. Good alignment  3. No obvious kyphotic deformity at this time    ASSESSMENT:  89 y.o. female with T9 compression fx with significant JOSE but good alignment and no focal kyphotic deformity.  She has lung mass suspicious for lung cancer but has refused " treatment.      PLAN:  1. Agree with TLSO brace  2. F/u with neurosurgery clinic in 4-6 wks  3. Rec f/u with PCP re: lung mass    **Please call with questions, concerns, or changes in the patient's neurological status.    Hunter Starkey D.O.  [facility]

## 2024-05-02 NOTE — ED PROVIDER NOTES
Encounter Date: 5/2/2024       History     Chief Complaint   Patient presents with    Fall     Arrives [facility] reports pt fell from standing this am landed on her buttock denies head trauma or blood thinners, c/o lower back pain     89-year-old female presents for evaluation of back pain described as middle of the back pain that radiates bilaterally to her sides.  She tells me this morning she was weighing herself on a scale, something she does every morning, when she slipped and fell, landing on her buttocks.  She did not hit her head or pass out.  She was not able to get up secondary to back pain.  No numbness or weakness of the extremities, no saddle anesthesia or change in bowel or bladder function.  No associated dizziness, chest pain or shortness of breath.  She has otherwise been in her usual state of health.    The history is provided by the patient.     Review of patient's allergies indicates:   Allergen Reactions    Penicillins Itching     rash     No past medical history on file.  No past surgical history on file.  No family history on file.  Social History     Tobacco Use    Smoking status: Never    Smokeless tobacco: Never     Review of Systems   Constitutional:  Negative for fever.   Eyes:  Negative for visual disturbance.   Respiratory:  Negative for shortness of breath.    Cardiovascular:  Negative for chest pain.   Gastrointestinal:  Negative for abdominal pain, blood in stool, diarrhea, nausea and vomiting.   Genitourinary:  Negative for difficulty urinating, frequency and vaginal bleeding.   Musculoskeletal:  Positive for back pain. Negative for myalgias and neck pain.   Skin:  Negative for wound.   Neurological:  Negative for dizziness, syncope, weakness, light-headedness, numbness and headaches.       Physical Exam     Initial Vitals [05/02/24 0644]   BP Pulse Resp Temp SpO2   (!) 155/76 76 16 98 °F (36.7 °C) 98 %      MAP       --         Physical Exam    Nursing note and vitals  reviewed.  Constitutional: She appears well-developed and well-nourished. She is not diaphoretic. No distress.   HENT:   Head: Normocephalic and atraumatic.   Mouth/Throat: Oropharynx is clear and moist.   Eyes: Conjunctivae and EOM are normal. Pupils are equal, round, and reactive to light.   Neck: Neck supple.   Normal range of motion.  Cardiovascular:  Normal rate, regular rhythm and intact distal pulses.           Pulmonary/Chest: Breath sounds normal. No respiratory distress. She has no wheezes. She has no rhonchi. She has no rales.   Abdominal: Abdomen is soft. Bowel sounds are normal. She exhibits no distension. There is no abdominal tenderness.   Musculoskeletal:         General: Normal range of motion.      Cervical back: Normal range of motion and neck supple.      Comments: Pelvis stable, nontender.  No C/T/L spine tenderness to palpation, deformity or step-off.  I am unable to reproduce her back pain on exam.     Neurological: She is alert and oriented to person, place, and time. She has normal strength. No cranial nerve deficit or sensory deficit. GCS score is 15. GCS eye subscore is 4. GCS verbal subscore is 5. GCS motor subscore is 6.   Skin: Skin is warm and dry. Capillary refill takes less than 2 seconds.   Psychiatric: She has a normal mood and affect.         ED Course   Procedures  Labs Reviewed - No data to display       Imaging Results              X-Ray Thoracolumbar Spine AP Lateral (Final result)  Result time 05/02/24 09:41:25      Final result by Miriam Tinajero MD (05/02/24 09:41:25)                   Impression:      New severe compression deformity at T9.      Electronically signed by: Miriam Tinajero  Date:    05/02/2024  Time:    09:41               Narrative:    EXAMINATION:  XR THORACOLUMBAR SPINE AP LATERAL    CLINICAL HISTORY:  Unspecified fall, initial encounter    TECHNIQUE:  AP and lateral views of the thoracolumbar spine were performed.    COMPARISON:  CT chest abdomen  pelvis 02/13/2024    FINDINGS:  There is a new severe compression deformity at T9.  Otherwise vertebral body heights appear maintained.    Unchanged grade 1 anterolisthesis of L4 on L5.  Unchanged curvature of the thoracolumbar spine.  Multilevel disc space narrowing, endplate sclerosis and osseous ridging.  Multilevel facet arthropathy.    Aortic atherosclerosis.                                       X-Ray Chest AP Portable (Final result)  Result time 05/02/24 08:39:13      Final result by Kvng Mccoy MD (05/02/24 08:39:13)                   Impression:      Persistent left upper lobe mass.    No other significant abnormality or change as compared with the previous exam      Electronically signed by: Kvng Mccoy  Date:    05/02/2024  Time:    08:39               Narrative:    EXAMINATION:  XR CHEST AP PORTABLE    CLINICAL HISTORY:  Unspecified fall, initial encounter    TECHNIQUE:  Single frontal view of the chest was performed.    COMPARISON:  February 12, 2024    FINDINGS:  Examination reveals mediastinal silhouette to be within normal limits cardiac silhouette is not enlarged but it is at the upper limits of normal there is a spur persistent mass in the left upper lobe similar in position characteristics as compared with the previous exam.    Lung fields are otherwise clear and free of gross infiltrates atelectasis or effusions                                    X-Rays:   Independently Interpreted Readings:   Chest X-Ray: No acute abnormalities. Left upper lobe mass, present on previous imaging     Medications   hydrocodone-apap 7.5-325 MG/15 ML oral solution 5 mL (has no administration in time range)     Medical Decision Making  89-year-old female presents for mechanical fall with subsequent mid back pain that wraps around bilaterally.  I am unable to reproduce bony tenderness on exam, however considering her age and mechanism, I will obtain x-rays to assess for occult fracture.  Neurologic exam  is normal at this time, GCS 15 and she is hemodynamically stable.  She is politely declining pain medication right now.  Reassess    Differential diagnosis includes but is not limited to fracture, contusion, muscle spasm, and others    Amount and/or Complexity of Data Reviewed  Radiology: ordered and independent interpretation performed. Decision-making details documented in ED Course.               ED Course as of 05/02/24 1033   Thu May 02, 2024   0953 Reexamine.  Patient is resting comfortably.  She is still politely declining pain medication.  X-ray concerning for a compression fracture T9, loss of height 80%.  I have discussed results with the patient.  She has no changes in her strength or sensation.  She is asking to ambulate.  She needs to use the restroom and is able to hold her urine at this time.  If she is able to ambulate and has no incontinence or retention of bowel or bladder function, I do anticipate discharge with pain control to take as needed.  I will also give her a TLSO brace.  Follow-up and return precautions discussed. [RB]   1028 Patient able to ambulate with minimal assistance to the restroom (ambulatory at baseline with a walker) and back to bed.  She is able to urinate on her own without issue.  She is now requesting something for her pain.  A small dose of Norco will be given.  She has Norco at home and has tolerated this in the past.  She also has home health at home if she were to require further assistance.  I have discussed the case with Dr. Starkey, on-call for Neurosurgery.  Considering the patient is neurologically intact, he agrees with TLSO brace and outpatient follow-up in 4 weeks with repeat x-rays prior to the appointment.  Patient and her daughter at bedside have been advised that if she has any neurologic changes, worsening pain or any other acute issues, she is to follow-up sooner and come to the ED for any concern for spinal cord injury.  Patient will be discharged in stable  "condition once the brace has arrived. [RB]      ED Course User Index  [RB] Vanessa Hanson MD            BP (!) 165/75   Pulse 75   Temp 98 °F (36.7 °C) (Oral)   Resp (!) 23   Ht 5' 3" (1.6 m)   Wt 56.2 kg (124 lb)   SpO2 100%   BMI 21.97 kg/m²                    Clinical Impression:  Final diagnoses:  [W19.XXXA] Fall  [S22.070A] Closed wedge compression fracture of T9 vertebra, initial encounter (Primary)          ED Disposition Condition    Discharge Stable          ED Prescriptions       Medication Sig Dispense Start Date End Date Auth. Provider    HYDROcodone-acetaminophen (NORCO) 5-325 mg per tablet Take 0.5 tablets by mouth every 6 (six) hours as needed for Pain. 10 tablet 5/2/2024 5/7/2024 Vanessa Hanson MD          Follow-up Information       Follow up With Specialties Details Why Contact Estrella Rico NP Internal Medicine Schedule an appointment as soon as possible for a visit in 1 day  [address] [facility] [address] [phone]      [facility]  Emergency Dept Emergency Medicine  As needed, If symptoms worsen [address] [facility] [address] [phone]    Vidhi Quiroz MD Neurosurgery Schedule an appointment as soon as possible for a visit in 4 weeks reevaluation; please obtain thoracic spine xrays prior to the appointment per your primary care provider [address] [place] [address] [address]  [place] [address] [phone]               Vanessa Hanson MD  05/02/24 1033    "

## 2024-05-07 ENCOUNTER — HOSPITAL ENCOUNTER (INPATIENT)
Facility: HOSPITAL | Age: 89
LOS: 6 days | Discharge: HOSPICE | DRG: 551 | End: 2024-05-13
Attending: INTERNAL MEDICINE | Admitting: INTERNAL MEDICINE
Payer: MEDICARE

## 2024-05-07 DIAGNOSIS — J90 PLEURAL EFFUSION: ICD-10-CM

## 2024-05-07 DIAGNOSIS — R91.8 LUNG MASS: ICD-10-CM

## 2024-05-07 DIAGNOSIS — M54.50 ACUTE MIDLINE LOW BACK PAIN WITHOUT SCIATICA: Primary | ICD-10-CM

## 2024-05-07 DIAGNOSIS — S22.070D CLOSED WEDGE COMPRESSION FRACTURE OF T9 VERTEBRA WITH ROUTINE HEALING, SUBSEQUENT ENCOUNTER: ICD-10-CM

## 2024-05-07 DIAGNOSIS — R07.9 CHEST PAIN: ICD-10-CM

## 2024-05-07 PROBLEM — S22.070A COMPRESSION FRACTURE OF T9 VERTEBRA: Status: ACTIVE | Noted: 2024-05-07

## 2024-05-07 LAB
ALBUMIN SERPL-MCNC: 2.5 G/DL (ref 3.4–4.8)
ALBUMIN/GLOB SERPL: 0.7 RATIO (ref 1.1–2)
ALP SERPL-CCNC: 58 UNIT/L (ref 40–150)
ALT SERPL-CCNC: 36 UNIT/L (ref 0–55)
AST SERPL-CCNC: 30 UNIT/L (ref 5–34)
BASOPHILS # BLD AUTO: 0.06 X10(3)/MCL
BASOPHILS NFR BLD AUTO: 0.4 %
BILIRUB SERPL-MCNC: 0.3 MG/DL
BUN SERPL-MCNC: 20.2 MG/DL (ref 9.8–20.1)
CALCIUM SERPL-MCNC: 8.4 MG/DL (ref 8.4–10.2)
CHLORIDE SERPL-SCNC: 89 MMOL/L (ref 98–111)
CO2 SERPL-SCNC: 25 MMOL/L (ref 23–31)
CREAT SERPL-MCNC: 0.51 MG/DL (ref 0.55–1.02)
EOSINOPHIL # BLD AUTO: 0.08 X10(3)/MCL (ref 0–0.9)
EOSINOPHIL NFR BLD AUTO: 0.5 %
ERYTHROCYTE [DISTWIDTH] IN BLOOD BY AUTOMATED COUNT: 16.5 % (ref 11.5–17)
GFR SERPLBLD CREATININE-BSD FMLA CKD-EPI: >60 ML/MIN/1.73/M2
GLOBULIN SER-MCNC: 3.6 GM/DL (ref 2.4–3.5)
GLUCOSE SERPL-MCNC: 110 MG/DL (ref 75–121)
HCT VFR BLD AUTO: 25.8 % (ref 37–47)
HGB BLD-MCNC: 8.6 G/DL (ref 12–16)
IMM GRANULOCYTES # BLD AUTO: 0.06 X10(3)/MCL (ref 0–0.04)
IMM GRANULOCYTES NFR BLD AUTO: 0.4 %
LYMPHOCYTES # BLD AUTO: 1.75 X10(3)/MCL (ref 0.6–4.6)
LYMPHOCYTES NFR BLD AUTO: 11.3 %
MAGNESIUM SERPL-MCNC: 1.4 MG/DL (ref 1.6–2.6)
MCH RBC QN AUTO: 24.5 PG (ref 27–31)
MCHC RBC AUTO-ENTMCNC: 33.3 G/DL (ref 33–36)
MCV RBC AUTO: 73.5 FL (ref 80–94)
MONOCYTES # BLD AUTO: 1.05 X10(3)/MCL (ref 0.1–1.3)
MONOCYTES NFR BLD AUTO: 6.8 %
NEUTROPHILS # BLD AUTO: 12.5 X10(3)/MCL (ref 2.1–9.2)
NEUTROPHILS NFR BLD AUTO: 80.6 %
NRBC BLD AUTO-RTO: 0 %
PLATELET # BLD AUTO: 591 X10(3)/MCL (ref 130–400)
PMV BLD AUTO: 8.3 FL (ref 7.4–10.4)
POTASSIUM SERPL-SCNC: 4.4 MMOL/L (ref 3.5–5.1)
PROT SERPL-MCNC: 6.1 GM/DL (ref 5.8–7.6)
RBC # BLD AUTO: 3.51 X10(6)/MCL (ref 4.2–5.4)
SODIUM SERPL-SCNC: 122 MMOL/L (ref 132–146)
WBC # SPEC AUTO: 15.5 X10(3)/MCL (ref 4.5–11.5)

## 2024-05-07 PROCEDURE — 11000001 HC ACUTE MED/SURG PRIVATE ROOM

## 2024-05-07 PROCEDURE — 83735 ASSAY OF MAGNESIUM: CPT | Performed by: NURSE PRACTITIONER

## 2024-05-07 PROCEDURE — 63600175 PHARM REV CODE 636 W HCPCS

## 2024-05-07 PROCEDURE — 96375 TX/PRO/DX INJ NEW DRUG ADDON: CPT

## 2024-05-07 PROCEDURE — 63600175 PHARM REV CODE 636 W HCPCS: Performed by: NURSE PRACTITIONER

## 2024-05-07 PROCEDURE — 99285 EMERGENCY DEPT VISIT HI MDM: CPT | Mod: 25

## 2024-05-07 PROCEDURE — 80053 COMPREHEN METABOLIC PANEL: CPT | Performed by: NURSE PRACTITIONER

## 2024-05-07 PROCEDURE — 25000003 PHARM REV CODE 250: Performed by: NURSE PRACTITIONER

## 2024-05-07 PROCEDURE — 85025 COMPLETE CBC W/AUTO DIFF WBC: CPT | Performed by: NURSE PRACTITIONER

## 2024-05-07 PROCEDURE — 96374 THER/PROPH/DIAG INJ IV PUSH: CPT

## 2024-05-07 RX ORDER — LEVOTHYROXINE SODIUM 125 UG/1
125 TABLET ORAL
Status: DISCONTINUED | OUTPATIENT
Start: 2024-05-08 | End: 2024-05-13 | Stop reason: HOSPADM

## 2024-05-07 RX ORDER — MORPHINE SULFATE 4 MG/ML
4 INJECTION, SOLUTION INTRAMUSCULAR; INTRAVENOUS EVERY 4 HOURS PRN
Status: DISCONTINUED | OUTPATIENT
Start: 2024-05-07 | End: 2024-05-13

## 2024-05-07 RX ORDER — ALUMINUM HYDROXIDE, MAGNESIUM HYDROXIDE, AND SIMETHICONE 1200; 120; 1200 MG/30ML; MG/30ML; MG/30ML
30 SUSPENSION ORAL 4 TIMES DAILY PRN
Status: DISCONTINUED | OUTPATIENT
Start: 2024-05-07 | End: 2024-05-13 | Stop reason: HOSPADM

## 2024-05-07 RX ORDER — FENTANYL CITRATE 50 UG/ML
50 INJECTION, SOLUTION INTRAMUSCULAR; INTRAVENOUS
Status: COMPLETED | OUTPATIENT
Start: 2024-05-07 | End: 2024-05-07

## 2024-05-07 RX ORDER — ONDANSETRON HYDROCHLORIDE 2 MG/ML
4 INJECTION, SOLUTION INTRAVENOUS
Status: COMPLETED | OUTPATIENT
Start: 2024-05-07 | End: 2024-05-07

## 2024-05-07 RX ORDER — IBUPROFEN 200 MG
16 TABLET ORAL
Status: DISCONTINUED | OUTPATIENT
Start: 2024-05-07 | End: 2024-05-13 | Stop reason: HOSPADM

## 2024-05-07 RX ORDER — GLUCAGON 1 MG
1 KIT INJECTION
Status: DISCONTINUED | OUTPATIENT
Start: 2024-05-07 | End: 2024-05-13 | Stop reason: HOSPADM

## 2024-05-07 RX ORDER — PANTOPRAZOLE SODIUM 40 MG/1
40 TABLET, DELAYED RELEASE ORAL DAILY
Status: DISCONTINUED | OUTPATIENT
Start: 2024-05-08 | End: 2024-05-13 | Stop reason: HOSPADM

## 2024-05-07 RX ORDER — ATORVASTATIN CALCIUM 10 MG/1
20 TABLET, FILM COATED ORAL DAILY
Status: DISCONTINUED | OUTPATIENT
Start: 2024-05-08 | End: 2024-05-13 | Stop reason: HOSPADM

## 2024-05-07 RX ORDER — OXYCODONE AND ACETAMINOPHEN 5; 325 MG/1; MG/1
1 TABLET ORAL EVERY 4 HOURS PRN
Status: DISCONTINUED | OUTPATIENT
Start: 2024-05-07 | End: 2024-05-10

## 2024-05-07 RX ORDER — BUSPIRONE HYDROCHLORIDE 7.5 MG/1
7.5 TABLET ORAL 3 TIMES DAILY
Status: DISCONTINUED | OUTPATIENT
Start: 2024-05-07 | End: 2024-05-13 | Stop reason: HOSPADM

## 2024-05-07 RX ORDER — ACETAMINOPHEN 325 MG/1
650 TABLET ORAL EVERY 6 HOURS PRN
Status: DISCONTINUED | OUTPATIENT
Start: 2024-05-07 | End: 2024-05-13 | Stop reason: HOSPADM

## 2024-05-07 RX ORDER — POLYETHYLENE GLYCOL 3350 17 G/17G
17 POWDER, FOR SOLUTION ORAL 2 TIMES DAILY PRN
Status: DISCONTINUED | OUTPATIENT
Start: 2024-05-07 | End: 2024-05-13 | Stop reason: HOSPADM

## 2024-05-07 RX ORDER — NALOXONE HCL 0.4 MG/ML
0.02 VIAL (ML) INJECTION
Status: DISCONTINUED | OUTPATIENT
Start: 2024-05-07 | End: 2024-05-13 | Stop reason: HOSPADM

## 2024-05-07 RX ORDER — TALC
6 POWDER (GRAM) TOPICAL NIGHTLY PRN
Status: DISCONTINUED | OUTPATIENT
Start: 2024-05-07 | End: 2024-05-13 | Stop reason: HOSPADM

## 2024-05-07 RX ORDER — INSULIN ASPART 100 [IU]/ML
0-10 INJECTION, SOLUTION INTRAVENOUS; SUBCUTANEOUS
Status: DISCONTINUED | OUTPATIENT
Start: 2024-05-07 | End: 2024-05-13 | Stop reason: HOSPADM

## 2024-05-07 RX ORDER — ENOXAPARIN SODIUM 100 MG/ML
40 INJECTION SUBCUTANEOUS EVERY 24 HOURS
Status: DISCONTINUED | OUTPATIENT
Start: 2024-05-08 | End: 2024-05-13 | Stop reason: HOSPADM

## 2024-05-07 RX ORDER — SIMETHICONE 80 MG
1 TABLET,CHEWABLE ORAL 4 TIMES DAILY PRN
Status: DISCONTINUED | OUTPATIENT
Start: 2024-05-07 | End: 2024-05-13 | Stop reason: HOSPADM

## 2024-05-07 RX ORDER — ONDANSETRON HYDROCHLORIDE 2 MG/ML
4 INJECTION, SOLUTION INTRAVENOUS EVERY 4 HOURS PRN
Status: DISCONTINUED | OUTPATIENT
Start: 2024-05-07 | End: 2024-05-13 | Stop reason: HOSPADM

## 2024-05-07 RX ORDER — IBUPROFEN 200 MG
24 TABLET ORAL
Status: DISCONTINUED | OUTPATIENT
Start: 2024-05-07 | End: 2024-05-13 | Stop reason: HOSPADM

## 2024-05-07 RX ORDER — PROCHLORPERAZINE EDISYLATE 5 MG/ML
5 INJECTION INTRAMUSCULAR; INTRAVENOUS EVERY 6 HOURS PRN
Status: DISCONTINUED | OUTPATIENT
Start: 2024-05-07 | End: 2024-05-13 | Stop reason: HOSPADM

## 2024-05-07 RX ORDER — METOPROLOL SUCCINATE 50 MG/1
50 TABLET, EXTENDED RELEASE ORAL DAILY
Status: DISCONTINUED | OUTPATIENT
Start: 2024-05-08 | End: 2024-05-13 | Stop reason: HOSPADM

## 2024-05-07 RX ADMIN — MORPHINE SULFATE 4 MG: 4 INJECTION INTRAVENOUS at 09:05

## 2024-05-07 RX ADMIN — ONDANSETRON 4 MG: 2 INJECTION INTRAMUSCULAR; INTRAVENOUS at 04:05

## 2024-05-07 RX ADMIN — FENTANYL CITRATE 50 MCG: 50 INJECTION, SOLUTION INTRAMUSCULAR; INTRAVENOUS at 04:05

## 2024-05-07 RX ADMIN — BUSPIRONE HYDROCHLORIDE 7.5 MG: 7.5 TABLET ORAL at 10:05

## 2024-05-07 NOTE — Clinical Note
Diagnosis: Acute midline low back pain without sciatica [[id number]]   Future Attending Provider: PATRICIA ZIMMERMAN [[id number]]   Admit to which facility:: [facility] [[id number]]   Reason for IP Medical Treatment  (Clinical interventions that can only be accomplished in the IP setting? ) :: evaluation and treamtent of t9 fracture   I certify that Inpatient services for greater than or equal to 2 midnights are medically necessary:: Yes   Plans for Post-Acute care--if anticipated (pick the single best option):: A. No post acute care anticipated at this time   Special Needs:: No Special Needs [1]

## 2024-05-07 NOTE — ED PROVIDER NOTES
Encounter Date: 5/7/2024       History     Chief Complaint   Patient presents with    Back Pain     C/O 10/10 back pain. Has T9 fracture, ran out of pain meds. Ambulatory with pain with ems     89 y.o. White female with a history of htn, anxiety, and hyperlipidemia presents to Emergency Department with a chief complaint of back pain. Symptoms began several days ago after a fall and have been constant since onset. Patient seen in ER several days ago and dx with T9 fracture. Placed in TLSO brace and instructed to f/u with neurosurgery. Patient reports pain has not improved despite med admin.. Associated symptoms include none. Symptoms are aggravated with palpation and there are no alleviating factors. The patient denies CP, SOB, additional injury, incontinence, vomiting, or paresthesias.. No other reported symptoms at this time. PCP: YUE Moraes       The history is provided by the patient. No  was used.   Back Pain   This is a new problem. The current episode started several days ago. The problem occurs throughout the day. The problem has been unchanged. The pain is associated with falling. The pain is present in the thoracic spine and lumbar spine. The pain does not radiate. The pain is The same all the time. Stiffness is present All day. Pertinent negatives include no chest pain, no fever, no numbness, no abdominal pain, no abdominal swelling, no bowel incontinence, no perianal numbness, no bladder incontinence, no paresthesias, no paresis, no tingling and no weakness. She has tried analgesics for the symptoms. The treatment provided no relief.     Review of patient's allergies indicates:   Allergen Reactions    Penicillins Itching     rash     Past Medical History:   Diagnosis Date    Anxiety disorder, unspecified     HTN (hypertension)     Mixed hyperlipidemia     Thyroid disease      Past Surgical History:   Procedure Laterality Date    CATARACT EXTRACTION Bilateral     CHOLECYSTECTOMY       HERNIA REPAIR      HYSTERECTOMY      KNEE SURGERY Right      No family history on file.  Social History     Tobacco Use    Smoking status: Never    Smokeless tobacco: Never     Review of Systems   Constitutional:  Negative for chills, fatigue and fever.   Eyes:  Negative for photophobia.   Respiratory:  Negative for cough, shortness of breath, wheezing and stridor.    Cardiovascular:  Negative for chest pain.   Gastrointestinal:  Negative for abdominal pain, bowel incontinence, nausea and vomiting.   Genitourinary:  Negative for bladder incontinence.   Musculoskeletal:  Positive for back pain. Negative for gait problem.   Neurological:  Negative for dizziness, tingling, syncope, weakness, numbness and paresthesias.   All other systems reviewed and are negative.      Physical Exam     Initial Vitals [05/07/24 1535]   BP Pulse Resp Temp SpO2   (!) 176/89 90 18 97.7 °F (36.5 °C) 97 %      MAP       --         Physical Exam    Nursing note and vitals reviewed.  Constitutional: She appears well-developed and well-nourished. She is not diaphoretic. She is cooperative.  Non-toxic appearance. No distress.   TLSO brace off patient.    HENT:   Head: Normocephalic.   Right Ear: External ear normal.   Left Ear: External ear normal.   Nose: Nose normal.   Eyes: Conjunctivae and EOM are normal. Pupils are equal, round, and reactive to light.   Neck: Neck supple.   Normal range of motion.  Cardiovascular:  Normal rate, regular rhythm, S1 normal, S2 normal, normal heart sounds, intact distal pulses and normal pulses.           Pulmonary/Chest: Effort normal and breath sounds normal. No tachypnea and no bradypnea. No respiratory distress. She has no decreased breath sounds. She has no wheezes. She has no rhonchi. She has no rales. She exhibits no tenderness.   Abdominal: Abdomen is soft. Bowel sounds are normal. She exhibits no distension. There is no abdominal tenderness. There is no rebound.   Musculoskeletal:         General:  Normal range of motion.      Cervical back: Normal, normal range of motion and neck supple.      Thoracic back: Tenderness present.      Lumbar back: Tenderness present.      Comments: Tenderness noted to thoracic and lumbar spine. Full 5/5 ROM noted. CMS intact. All other adjacent joints otherwise normal. No cervical spine tenderness.        Neurological: She is alert and oriented to person, place, and time. She has normal strength. No sensory deficit. GCS score is 15. GCS eye subscore is 4. GCS verbal subscore is 5. GCS motor subscore is 6.   Skin: Skin is warm and dry. Capillary refill takes less than 2 seconds.   Psychiatric: She has a normal mood and affect. Thought content normal.         ED Course   Procedures  Labs Reviewed - No data to display       Imaging Results               CT Thoracic Spine Without Contrast (Final result)  Result time 05/07/24 18:19:50      Final result by Noa Parker MD (05/07/24 18:19:50)                   Impression:      T9 compression fracture consistent with vertebral plana with some posterior buckling of the mid fracture fragment.    Large mass in the left lung apex with a pleural based area of nodularity seen in the left upper hemithorax as well.  Both of these lesions are larger than the prior examination    Bilateral pleural effusions slightly worse than the prior examination some mild supraclavicular lymphadenopathy on the right side.  The chest findings are incompletely evaluated on this CT scan of the thoracic spine..    This report was flagged in Epic as abnormal.      Electronically signed by: Lucien Parker  Date:    05/07/2024  Time:    18:19               Narrative:    EXAMINATION:  CT THORACIC SPINE WITHOUT CONTRAST    CLINICAL HISTORY:  Compression fracture, thoracic;    TECHNIQUE:  Multiple axial images were obtained of the thoracic spine and sagittal and coronal reconstructions were performed.  Automatic exposure control (AEC) is utilized to reduce  patient radiation exposure.    COMPARISON:  CT scan of the chest abdomen pelvis from 02/13/2024.    FINDINGS:  The bones are diffusely osteoporotic.  There is a compression fracture seen at the level of T9.  It is new since the prior examination.  There is evidence of vertebral plana.  There is mild posterior buckling of the mid fracture fragment.  No obvious spinal stenosis is seen on this examination.  No other fractures are seen.    There is a large mass in the left upper lobe of the lung.  It is larger than the prior examination.  There is a pleural base lesion seen in the left upper hemithorax which appears slightly more prominent..  Visualized portion of the ribs show no acute fracture.  There are bilateral pleural effusions and bibasilar atelectasis.  This is worse than the prior examination.  There is some supraclavicular lymphadenopathy seen on the right side.  The chest is incompletely evaluated on this examination.                                       CT Lumbar Spine Without Contrast (Final result)  Result time 05/07/24 18:05:15      Final result by Noa Parker MD (05/07/24 18:05:15)                   Impression:      Chronic changes seen in the lumbar spine as outlined above    Areas of small effusions and atelectasis in the lung bases which is not well evaluated on this examination.      Electronically signed by: Lucien Parker  Date:    05/07/2024  Time:    18:05               Narrative:    EXAMINATION:  CT LUMBAR SPINE WITHOUT CONTRAST    CLINICAL HISTORY:  Low back pain, trauma;    TECHNIQUE:  Low-dose axial images with sagittal and coronal reformations are obtained through the lumbar spine.  Contrast was not administered.  Automatic exposure control (AEC) is utilized to reduce patient radiation exposure.    COMPARISON:  CT scan of the chest abdomen pelvis from 02/13/2024    FINDINGS:  The bones are diffusely osteoporotic.  The vertebral body heights are well maintained in the lumbar  spine.  There is a hemangioma at the level of L4.  This was seen on the prior examination as well.  There is anterolisthesis of L4 on L5 that measures 6 mm.  This is stable since 02/13/2024.  There is multilevel facet arthropathy seen.  No lumbar spine fracture is seen.  The upper sacrum appears to be intact.  There is mild levoscoliosis of the lumbar spine with apex at L2-L3.  There is some evidence of foraminal stenosis at multiple levels in the lower lumbar spine.    No soft tissue abnormality seen.    Lower thoracic region shows some bilateral pleural effusions and right lower lobe atelectasis which are incompletely evaluated on this examination.                                       Medications   fentaNYL injection 50 mcg (50 mcg Intravenous Given 5/7/24 1657)   ondansetron injection 4 mg (4 mg Intravenous Given 5/7/24 1657)     Medical Decision Making  Patient awake, alert, has non-labored breathing, and follows commands appropriately. Arrived to ED due to back pain. Had a fall several days ago. Denies additional injury. Dx with T9 fracture. Afebrile. NAD Noted.           Differential Diagnosis: Compression Fracture, Fall, Pain    Amount and/or Complexity of Data Reviewed  External Data Reviewed: radiology and notes.     Details: Patient slipped and fell on 05/02/24 and was seen in ER. XR revealed T9 compression fracture. Neurosurgery consulted in ER who recommended TLSO brace placement and outpatient f/u in 4 weeks.     Previous imaging of chest revealed L lung mass. XR chest on 05/02/24- Persistent left upper lobe mass.  CT chest performed on 02/12/24- Large mass in the left upper lobe of the lung concerning for malignancy. Small left-sided pleural effusion. Simple appearing cyst in the left kidney.    Radiology: ordered.     Details: CT lumbar spine- Chronic changes seen in the lumbar spine as outlined above Areas of small effusions and atelectasis in the lung bases which is not well evaluated on this  examination. CT thoracic spine-T9 compression fracture consistent with vertebral plana with some posterior buckling of the mid fracture fragment. Large mass in the left lung apex with a pleural based area of nodularity seen in the left upper hemithorax as well.  Both of these lesions are larger than the prior examination. Bilateral pleural effusions slightly worse than the prior examination some mild supraclavicular lymphadenopathy on the right side.  The chest findings are incompletely evaluated on this CT scan of the thoracic spine.. Informed patient of results. Reports she was aware of lung mass.     Discussion of management or test interpretation with external provider(s): Patient lives at home alone, is having difficulty controlling pain, and reports due to injury she has been having trouble performing normal ADLs. Will admit patient for observation. Discussed plan of care and interventions with patient. Agreed to and aware of plan of care. Comfortable being admitted.     Risk  Prescription drug management.  Decision regarding hospitalization.               ED Course as of 05/07/24 1832 Tue May 07, 2024   1808  paged.  [JA]   1820 Discussed patient with Dr. Coleman. Aware of admission for pain control.  [JA]   1826 Discussed patient with MANSOOR Hawkins, with hospital medicine. Will admit to services with consult to neurosurgery. No further instruction or recommendations given.  [JA]   1828 Discussed patient and results with Dr. Quintanilla, with neurosurgery. Aware of admission to hospital. Reports patient need to continue to wear TLSO brace. No further instruction or recommendations given.  [JA]      ED Course User Index  [JA] An Bishop, MANSOOR                           Clinical Impression:  Final diagnoses:  [M54.50] Acute midline low back pain without sciatica (Primary)  [S22.070D] Closed wedge compression fracture of T9 vertebra with routine healing, subsequent encounter  [R91.8] Lung mass  [J90] Pleural  effusion          ED Disposition Condition    Admit Stable                An Bishop NP  05/07/24 1832       An Bishop NP  05/07/24 183

## 2024-05-08 DIAGNOSIS — S22.070A COMPRESSION FRACTURE OF T9 VERTEBRA, INITIAL ENCOUNTER: Primary | ICD-10-CM

## 2024-05-08 LAB
ALBUMIN SERPL-MCNC: 2.3 G/DL (ref 3.4–4.8)
ALBUMIN/GLOB SERPL: 0.7 RATIO (ref 1.1–2)
ALP SERPL-CCNC: 54 UNIT/L (ref 40–150)
ALT SERPL-CCNC: 32 UNIT/L (ref 0–55)
APPEARANCE UR: CLEAR
AST SERPL-CCNC: 24 UNIT/L (ref 5–34)
BACTERIA #/AREA URNS AUTO: ABNORMAL /HPF
BASOPHILS # BLD AUTO: 0.06 X10(3)/MCL
BASOPHILS NFR BLD AUTO: 0.5 %
BILIRUB SERPL-MCNC: 0.4 MG/DL
BILIRUB UR QL STRIP.AUTO: NEGATIVE
BUN SERPL-MCNC: 16.4 MG/DL (ref 9.8–20.1)
CALCIUM SERPL-MCNC: 8.4 MG/DL (ref 8.4–10.2)
CHLORIDE SERPL-SCNC: 92 MMOL/L (ref 98–111)
CO2 SERPL-SCNC: 24 MMOL/L (ref 23–31)
COLOR UR AUTO: ABNORMAL
CREAT SERPL-MCNC: 0.52 MG/DL (ref 0.55–1.02)
EOSINOPHIL # BLD AUTO: 0.1 X10(3)/MCL (ref 0–0.9)
EOSINOPHIL NFR BLD AUTO: 0.8 %
ERYTHROCYTE [DISTWIDTH] IN BLOOD BY AUTOMATED COUNT: 16.6 % (ref 11.5–17)
GFR SERPLBLD CREATININE-BSD FMLA CKD-EPI: >60 ML/MIN/1.73/M2
GLOBULIN SER-MCNC: 3.3 GM/DL (ref 2.4–3.5)
GLUCOSE SERPL-MCNC: 90 MG/DL (ref 75–121)
GLUCOSE UR QL STRIP.AUTO: NORMAL
HCT VFR BLD AUTO: 25.9 % (ref 37–47)
HGB BLD-MCNC: 8.5 G/DL (ref 12–16)
IMM GRANULOCYTES # BLD AUTO: 0.03 X10(3)/MCL (ref 0–0.04)
IMM GRANULOCYTES NFR BLD AUTO: 0.2 %
KETONES UR QL STRIP.AUTO: NEGATIVE
LEUKOCYTE ESTERASE UR QL STRIP.AUTO: NEGATIVE
LYMPHOCYTES # BLD AUTO: 1.6 X10(3)/MCL (ref 0.6–4.6)
LYMPHOCYTES NFR BLD AUTO: 12.8 %
MAGNESIUM SERPL-MCNC: 1.9 MG/DL (ref 1.6–2.6)
MCH RBC QN AUTO: 24.7 PG (ref 27–31)
MCHC RBC AUTO-ENTMCNC: 32.8 G/DL (ref 33–36)
MCV RBC AUTO: 75.3 FL (ref 80–94)
MONOCYTES # BLD AUTO: 1.15 X10(3)/MCL (ref 0.1–1.3)
MONOCYTES NFR BLD AUTO: 9.2 %
NEUTROPHILS # BLD AUTO: 9.6 X10(3)/MCL (ref 2.1–9.2)
NEUTROPHILS NFR BLD AUTO: 76.5 %
NITRITE UR QL STRIP.AUTO: NEGATIVE
NRBC BLD AUTO-RTO: 0 %
PH UR STRIP.AUTO: 6 [PH]
PHOSPHATE SERPL-MCNC: 2.9 MG/DL (ref 2.3–4.7)
PLATELET # BLD AUTO: 636 X10(3)/MCL (ref 130–400)
PMV BLD AUTO: 8.6 FL (ref 7.4–10.4)
POCT GLUCOSE: 114 MG/DL (ref 70–110)
POCT GLUCOSE: 151 MG/DL (ref 70–110)
POCT GLUCOSE: 98 MG/DL (ref 70–110)
POTASSIUM SERPL-SCNC: 4.4 MMOL/L (ref 3.5–5.1)
PROT SERPL-MCNC: 5.6 GM/DL (ref 5.8–7.6)
PROT UR QL STRIP.AUTO: ABNORMAL
RBC # BLD AUTO: 3.44 X10(6)/MCL (ref 4.2–5.4)
RBC #/AREA URNS AUTO: ABNORMAL /HPF
RBC UR QL AUTO: NEGATIVE
SODIUM SERPL-SCNC: 124 MMOL/L (ref 132–146)
SP GR UR STRIP.AUTO: 1.02 (ref 1–1.03)
SQUAMOUS #/AREA URNS LPF: ABNORMAL /HPF
UROBILINOGEN UR STRIP-ACNC: NORMAL
WBC # SPEC AUTO: 12.54 X10(3)/MCL (ref 4.5–11.5)
WBC #/AREA URNS AUTO: ABNORMAL /HPF

## 2024-05-08 PROCEDURE — 97535 SELF CARE MNGMENT TRAINING: CPT

## 2024-05-08 PROCEDURE — 27000221 HC OXYGEN, UP TO 24 HOURS

## 2024-05-08 PROCEDURE — 97162 PT EVAL MOD COMPLEX 30 MIN: CPT

## 2024-05-08 PROCEDURE — 63600175 PHARM REV CODE 636 W HCPCS: Performed by: INTERNAL MEDICINE

## 2024-05-08 PROCEDURE — 81001 URINALYSIS AUTO W/SCOPE: CPT | Performed by: INTERNAL MEDICINE

## 2024-05-08 PROCEDURE — 83735 ASSAY OF MAGNESIUM: CPT | Performed by: NURSE PRACTITIONER

## 2024-05-08 PROCEDURE — 25000003 PHARM REV CODE 250: Performed by: NURSE PRACTITIONER

## 2024-05-08 PROCEDURE — 63600175 PHARM REV CODE 636 W HCPCS: Performed by: NURSE PRACTITIONER

## 2024-05-08 PROCEDURE — 84100 ASSAY OF PHOSPHORUS: CPT | Performed by: NURSE PRACTITIONER

## 2024-05-08 PROCEDURE — 80053 COMPREHEN METABOLIC PANEL: CPT | Performed by: NURSE PRACTITIONER

## 2024-05-08 PROCEDURE — 11000001 HC ACUTE MED/SURG PRIVATE ROOM

## 2024-05-08 PROCEDURE — 97166 OT EVAL MOD COMPLEX 45 MIN: CPT

## 2024-05-08 PROCEDURE — 85025 COMPLETE CBC W/AUTO DIFF WBC: CPT | Performed by: NURSE PRACTITIONER

## 2024-05-08 RX ORDER — MAGNESIUM SULFATE HEPTAHYDRATE 40 MG/ML
4 INJECTION, SOLUTION INTRAVENOUS ONCE
Status: DISCONTINUED | OUTPATIENT
Start: 2024-05-08 | End: 2024-05-08

## 2024-05-08 RX ORDER — SODIUM CHLORIDE 9 MG/ML
INJECTION, SOLUTION INTRAVENOUS CONTINUOUS
Status: DISCONTINUED | OUTPATIENT
Start: 2024-05-08 | End: 2024-05-10

## 2024-05-08 RX ORDER — MAGNESIUM SULFATE HEPTAHYDRATE 40 MG/ML
2 INJECTION, SOLUTION INTRAVENOUS
Status: COMPLETED | OUTPATIENT
Start: 2024-05-08 | End: 2024-05-08

## 2024-05-08 RX ADMIN — OXYCODONE HYDROCHLORIDE AND ACETAMINOPHEN 1 TABLET: 5; 325 TABLET ORAL at 09:05

## 2024-05-08 RX ADMIN — SODIUM CHLORIDE: 9 INJECTION, SOLUTION INTRAVENOUS at 08:05

## 2024-05-08 RX ADMIN — ACETAMINOPHEN 325MG 650 MG: 325 TABLET ORAL at 11:05

## 2024-05-08 RX ADMIN — BUSPIRONE HYDROCHLORIDE 7.5 MG: 7.5 TABLET ORAL at 10:05

## 2024-05-08 RX ADMIN — BUSPIRONE HYDROCHLORIDE 7.5 MG: 7.5 TABLET ORAL at 02:05

## 2024-05-08 RX ADMIN — Medication 6 MG: at 11:05

## 2024-05-08 RX ADMIN — ATORVASTATIN CALCIUM 20 MG: 10 TABLET, FILM COATED ORAL at 09:05

## 2024-05-08 RX ADMIN — LEVOTHYROXINE SODIUM 125 MCG: 125 TABLET ORAL at 06:05

## 2024-05-08 RX ADMIN — ENOXAPARIN SODIUM 40 MG: 40 INJECTION SUBCUTANEOUS at 04:05

## 2024-05-08 RX ADMIN — MORPHINE SULFATE 4 MG: 4 INJECTION INTRAVENOUS at 02:05

## 2024-05-08 RX ADMIN — OXYCODONE HYDROCHLORIDE AND ACETAMINOPHEN 1 TABLET: 5; 325 TABLET ORAL at 12:05

## 2024-05-08 RX ADMIN — LISINOPRIL 30 MG: 20 TABLET ORAL at 09:05

## 2024-05-08 RX ADMIN — PANTOPRAZOLE SODIUM 40 MG: 40 TABLET, DELAYED RELEASE ORAL at 09:05

## 2024-05-08 RX ADMIN — SODIUM CHLORIDE: 9 INJECTION, SOLUTION INTRAVENOUS at 11:05

## 2024-05-08 RX ADMIN — METOPROLOL SUCCINATE 50 MG: 50 TABLET, EXTENDED RELEASE ORAL at 09:05

## 2024-05-08 RX ADMIN — MAGNESIUM SULFATE HEPTAHYDRATE 2 G: 40 INJECTION, SOLUTION INTRAVENOUS at 02:05

## 2024-05-08 RX ADMIN — MAGNESIUM SULFATE HEPTAHYDRATE 2 G: 40 INJECTION, SOLUTION INTRAVENOUS at 04:05

## 2024-05-08 RX ADMIN — SODIUM CHLORIDE: 9 INJECTION, SOLUTION INTRAVENOUS at 02:05

## 2024-05-08 NOTE — PT/OT/SLP EVAL
Physical Therapy Evaluation    Patient Name:  Jacques Richmond   MRN:  [id number]    Recommendations:     Discharge therapy intensity: Moderate Intensity Therapy   Discharge Equipment Recommendations: walker, rolling   Barriers to discharge: Decreased caregiver support    Assessment:     Jacques Richmond is a 89 y.o. female who initially presented on 5/2 following a fall. Found to have a T9 compression fracture. She was discharged home with a brace. She presented back to ED on 5/7 with uncontrolled pain. Prior to admit, patient lived alone in a H. At baseline, she is independent and ambulates with a rollator. She presents with the following impairments/functional limitations: weakness, impaired endurance, impaired self care skills, impaired functional mobility, gait instability, impaired balance, decreased safety awareness, pain. She required MOD A for bed mobility. Ambulated 68 ft with RW & CGA. Limited by fatigue. Patient will benefit from continued PT services while in hospital to improve functional mobility & return to PLOF. Recommending MOD intensity therapy at discharge. Progress as tolerated.     Rehab Prognosis: Good; patient would benefit from acute skilled PT services to address these deficits and reach maximum level of function.    Recent Surgery: * No surgery found *      Plan:     During this hospitalization, patient would benefit from acute PT services 5 x/week to address the identified rehab impairments via gait training, therapeutic activities, therapeutic exercises, neuromuscular re-education and progress toward the following goals:    Plan of Care Expires:  06/08/24    Subjective     Chief Complaint: pain  Patient/Family Comments/goals: none  Pain/Comfort:  Pain Rating 1: 8/10  Location 1: back  Pain Addressed 1: Reposition, Distraction  Pain Rating Post-Intervention 1: 8/10    Patients cultural, spiritual, Mandaeism conflicts given the current situation: no    Living Environment:  [facility]   Prior to admission, patients level of function was independent.  Equipment used at home: rollator, shower chair.  DME owned (not currently used): none.  Upon discharge, patient will have assistance from no one.    Objective:     Communicated with nurse prior to session.  Patient found supine with peripheral IV, PureWick, SCD (LSO)  upon PT entry to room.    General Precautions: Standard, fall  Orthopedic Precautions:spinal precautions   Braces:  (MD recommending TLSO, pt with LSO from prior admission. Brace ordered from [facility])  Respiratory Status: Room air    Exams:  Cognitive Exam:  Patient is oriented to Person, Place, Time, and Situation  Sensation: -       Intact  RLE ROM: WFL  RLE Strength: -4/5 grossly  LLE ROM: WFL  LLE Strength: -4/5 grossly  Skin integrity: Visible skin intact      Functional Mobility:  Bed Mobility:  Supine to Sit: moderate assistance  Transfers:  Sit to Stand:  contact guard assistance with rolling walker  Gait: Ambulated 68 ft with RW & CGA. Limited by fatigue.  Balance: fair      AM-PAC 6 CLICK MOBILITY  Total Score:16     Education Provided:  Role and goals of PT, transfer training, bed mobility, gait training, balance training, safety awareness, assistive device, strengthening exercises, and importance of participating in PT to return to PLOF.    Patient left up in chair with all lines intact, call button in reach, daughters present, and educated on calling for assistance when ready to return to bed .    GOALS:   Multidisciplinary Problems       Physical Therapy Goals          Problem: Physical Therapy    Goal Priority Disciplines Outcome Goal Variances Interventions   Physical Therapy Goal     PT, PT/OT Progressing     Description: Goals to be met by: 24     Patient will increase functional independence with mobility by performin. Supine to sit with [place]  2. Sit to supine with [place]  3. Sit to stand transfer with Modified [place]  4.  Gait  x 300 feet with Modified [place] using Rolling Walker.                          History:     Past Medical History:   Diagnosis Date    Anxiety disorder, unspecified     HTN (hypertension)     Mixed hyperlipidemia     Thyroid disease        Past Surgical History:   Procedure Laterality Date    CATARACT EXTRACTION Bilateral     CHOLECYSTECTOMY      HERNIA REPAIR      HYSTERECTOMY      KNEE SURGERY Right        Time Tracking:     PT Received On: 05/08/24  PT Start Time: 1245     PT Stop Time: 1305  PT Total Time (min): 20 min     Billable Minutes: Evaluation 20 minutes      05/08/2024

## 2024-05-08 NOTE — PLAN OF CARE
05/08/24 1102   Discharge Assessment   Assessment Type Discharge Planning Assessment   Confirmed/corrected address, phone number and insurance Yes   Confirmed Demographics Correct on Facesheet   Source of Information family   If unable to respond/provide information was family/caregiver contacted? Yes   Contact Name/Number sujatha eliana hymanr, [phone]   Communicated MORA with patient/caregiver Date not available/Unable to determine   Reason For Admission Closed wedge compression fracture of T9 vertebra with routine healing, subsequent encounter, back pain   People in Home alone   Do you expect to return to your current living situation? Other (see comments)  (pending pt progress)   Do you have help at home or someone to help you manage your care at home? Yes   Who are your caregiver(s) and their phone number(s)? dttrang Little   Prior to hospitilization cognitive status: Unable to Assess   Current cognitive status:   (not AAO)   Home Layout Able to live on 1st floor   Equipment Currently Used at Home rollator;shower chair;slide board   Readmission within 30 days? No   Patient currently being followed by outpatient case management? No   Do you currently have service(s) that help you manage your care at home? Yes   Name and Contact number of agency [facility]   Is the pt/caregiver preference to resume services with current agency Yes   Do you take prescription medications? Yes   Do you have prescription coverage? Yes   Coverage mcr a&b, BCBS mcr   Do you have any problems affording any of your prescribed medications? No   Is the patient taking medications as prescribed? yes   Who is going to help you get home at discharge? family, other   How do you get to doctors appointments? family or friend will provide   Are you on dialysis? No   Do you take coumadin? No   Discharge Plan A Skilled Nursing Facility  (tbd)   Discharge Plan B Home Health   DME Needed Upon Discharge  other (see comments)  (tbd)   Discharge  Plan discussed with: Adult children   Transition of Care Barriers None     Completed assessment with pt's dtr on phone. Introduced self and explained role as SW. Dtr verb understanding to all questions asked. PCP is Estrella Moraes, NP and pharmacy is [facility] in [facility] in [place]. Pt is current with [facility], updates sent via [facility]. D/c dispo pending therapy evals. Dtr states she believes pt will need placement, as they cannot take her home in this condition.     FRANCES EscamillaW

## 2024-05-08 NOTE — H&P
[facility] Hospital Medicine - H&P Note    Patient Name: Jacques Richmond  : 1926  MRN: [id number]  PCP: Estrella Moraes NP  Admitting Physician: Natalie Fleming MD  Admission Class: IP- Inpatient   Length of Stay: 0  Face-to-Face encounter date: 2024  Code status: --DNR    Chief Complaint   Back pain    History of Present Illness   Ms. Richmond is a 89 year old female with a pmh of HTN, HLD, DM2, Hypothyroidism, GERD, osteoarthritis, and anxiety who presented to the ED with c/o back pain, not relieved by prescription pain meds.  She had a fall on  and was seen in this ED and dx with T9 compression fracture. Neurosurgery was consulted, she refused further imaging and wanted to go home. She was fitted for a TLSO brace at that time and told to f/u with neurosurgery and was prescribed Norco 5 1/2 tab every 6 hours. She states that she has continued to have unrelenting pain, so she decided to come back in.    ED vitals on arrival:  Temp 97.7° F, pulse 90, resp 18, /89, SpO2 97% on RA. No lab work performed while in the ED. CT thoracic spine without contrast showed T9 compression fracture consistent with vertebral plana with some posterior buckling of the mid fracture fragment.  Large mass in the left lung apex with a pleural-based area of nodularity seen in the left upper hemothorax as well.  Both of these lesions are larger than the prior examination.  Bilateral pleural effusion slightly worse than the prior examination.  Some mild supraclavicular lymphadenopathy on the right side.  CT lumbar spine without contrast showed chronic changes seen in the lumbar spine.  Neurosurgery was consulted in the ED. She was admitted to Hospital Medicine for management.    Patient knows about the lung mass is not interested in pursuing treatment.    Lab work revealed WBC 15.5, H&H 8.6/25.8, Na 122, Mag 1.4.      ROS   Except as documented, all other systems reviewed and negative      Past Medical History     Hypertension  Hyperlipidemia  Diabetes mellitus type 2   Hypothyroidism   GERD  Osteoarthritis   Anxiety    Past Surgical History     Past Surgical History:   Procedure Laterality Date    CATARACT EXTRACTION Bilateral     CHOLECYSTECTOMY      HERNIA REPAIR      HYSTERECTOMY      KNEE SURGERY Right        Social History         Denies tobacco, alcohol, illicit drug use     Family History   Reviewed and negative    Allergies   Penicillins    Home Medications     Prior to Admission medications    Medication Sig Start Date End Date Taking? Authorizing Provider   atorvastatin (LIPITOR) 20 MG tablet Take 20 mg by mouth once daily. 11/22/23  Yes Provider, Historical   busPIRone (BUSPAR) 7.5 MG tablet Take 7.5 mg by mouth 3 (three) times daily.   Yes Provider, Historical   hydrocodone-acetaminophen (HYCET) solution 7.5-325 mg/15mL Take 5 mLs by mouth every 6 (six) hours as needed for Pain. 5/2/24 5/7/24 Yes Vanessa Hanson MD   levothyroxine (SYNTHROID) 125 MCG tablet Take 125 mcg by mouth before breakfast.   Yes Provider, Historical   lisinopriL (PRINIVIL,ZESTRIL) 30 MG tablet Take 30 mg by mouth once daily. 1/10/24  Yes Provider, Historical   metFORMIN (GLUCOPHAGE) 500 MG tablet Take 500 mg by mouth 2 (two) times daily with meals.   Yes Provider, Historical   metoprolol succinate (TOPROL-XL) 50 MG 24 hr tablet Take 50 mg by mouth once daily.   Yes Provider, Historical   omeprazole (PRILOSEC OTC) 20 MG tablet Take 20 mg by mouth once daily.   Yes Provider, Historical   acetaminophen (TYLENOL) 500 MG tablet Take 1 tablet (500 mg total) by mouth every 6 (six) hours as needed for Pain. 1/14/24   Inocencio Augustine MD   docusate sodium (COLACE) 100 MG capsule Take 1 capsule (100 mg total) by mouth 2 (two) times daily. 1/14/24   Inocencio Augustine MD   meloxicam (MOBIC) 15 MG tablet Take 15 mg by mouth once daily. 2/11/24   Provider, Historical   polyethylene glycol (GLYCOLAX) 17 gram PwPk  "Take 17 g by mouth daily as needed for Constipation. 1/14/24   Inocencio Augustine MD        Physical Exam   Vital Signs  Temp:  [97.7 °F (36.5 °C)]   Pulse:  [89-90]   Resp:  [18-22]   BP: (173-176)/(89-91)   SpO2:  [94 %-97 %]    General: Well developed, well nourished. Appears comfortable  HEENT: NC/AT  Neck:  Supple. No JVD  Chest: Clear bilaterally, no wheezing, no accessory muscle use.  CVS: Regular rhythm. Normal S1/S2.  Abdomen: nondistended, normoactive BS, soft and non-tender.  MSK: No obvious deformity or joint swelling.    Skin: Warm and dry  Neuro: AAOx3, no focal neurological deficit  Psych: Cooperative      Labs     No results for input(s): "WBC", "RBC", "HGB", "HCT", "MCV", "MCH", "MCHC", "RDW", "PLT", "ABSNEUT", "SEDRATE", "HSCRP", "CRP" in the last 72 hours.  No results for input(s): "PROTIME", "INR", "PTT", "D-DIMER", "FERRITIN", "IRON", "TRANS", "TIBC", "LABIRON", "ERNOTXZD64", "FOLATE", "LDH", "HAPTOGLOBIN", "RETICCNTAUTO", "RETABS", "PERIPSMEAREV" in the last 72 hours.   No results for input(s): "NA", "K", "CHLORIDE", "CO2", "BUN", "CREATININE", "EGFRNORACEVR", "GLUCOSE", "CALCIUM", "MG", "PHOS", "ALBUMIN", "GLOBULIN", "ALKPHOS", "ALT", "AST", "BILITOT", "BILIDIR", "LIPASE", "HGBA1C", "CHOL", "TRIG", "LDL", "HDL", "TSH", "FREET4", "BNP" in the last 72 hours.  No results for input(s): "LACTIC" in the last 72 hours.  No results for input(s): "CPK", "TROPONINI" in the last 72 hours.     Microbiology Results (last 7 days)       ** No results found for the last 168 hours. **           Imaging     CT Thoracic Spine Without Contrast   Final Result   Abnormal      T9 compression fracture consistent with vertebral plana with some posterior buckling of the mid fracture fragment.      Large mass in the left lung apex with a pleural based area of nodularity seen in the left upper hemithorax as well.  Both of these lesions are larger than the prior examination      Bilateral pleural effusions slightly " worse than the prior examination some mild supraclavicular lymphadenopathy on the right side.  The chest findings are incompletely evaluated on this CT scan of the thoracic spine..      This report was flagged in Epic as abnormal.         Electronically signed by: Lucien Parker   Date:    05/07/2024   Time:    18:19      CT Lumbar Spine Without Contrast   Final Result      Chronic changes seen in the lumbar spine as outlined above      Areas of small effusions and atelectasis in the lung bases which is not well evaluated on this examination.         Electronically signed by: Lucien Parker   Date:    05/07/2024   Time:    18:05        Assessment & Plan   Intractable back pain from known T9 compression fracture  Known lung mass  Hypomagnesemia  Hyponatremia   Chronic microcytic anemia  Leukocytosis-likely reactive, no S/S infection    History: HTN, HLD, DM2, Hypothyroidism, GERD, osteoarthritis, and anxiety    Plan:  -Pain control  -Electrolyte replacement  -Encourage pt to wear TLSO brace  -Neurosurgery consulted in ED  -O2 delivery as needed  -Sliding scale insulin with accu-checks  -Antihypertensives as needed  -Resume home meds as appropriate  -Labs in AM       VTE Prophylaxis: SCDs, Lovenox       I, Dinora Coulter, NP have reviewed and discussed this case with Dr. Fleming.  Please see addendum for further assessment and plan from attending MD.

## 2024-05-08 NOTE — PLAN OF CARE
Met with pt and dtrs at bedside to discuss SNF placement. Provided list of facilities. They chose [facility] as first choice and [facility] as second. Referrals sent via [facility].     Shruti Bishop LCSW

## 2024-05-08 NOTE — HOSPITAL COURSE
5/8/24-Still c/o pain.  NS has been consulted.  Will await further recs.   DATE OF PROCEDURE:  12/22/2019



PREOPERATIVE DIAGNOSES:  

1. Right bimalleolar ankle fracture.

2. Right second metatarsal shaft fracture.

3. Right third metatarsal base fracture.



POSTOPERATIVE DIAGNOSES:  

1. Right bimalleolar ankle fracture.

2. Right second metatarsal shaft fracture.

3. Right third metatarsal base fracture.



PROCEDURES PERFORMED:  

1. Open reduction and internal fixation of right bimalleolar ankle fracture.

2. Closed treatment of right second and third metatarsal fractures.



ANESTHESIA:  General.



ASSISTANT:  Jesus.



TOURNIQUET TIME:  31 minutes at 300 mmHg.



IMPLANTS:  Synthes 7 hole, one-third tubular plate for the lateral malleolus and two

4.0 mm cancellous screws for the medial malleolus. 



COMPLICATIONS:  None.



DRAINS:  None.



SPECIMENS:  None.



OUTCOME:  Satisfactory.



INDICATIONS:  The patient is a pleasant 74-year-old lady, who sustained an injury to

both right ankle and foot after a trip on stairs and fall.  Workup in the emergency

room demonstrated a closed right bimalleolar ankle fractures as well as fractures of

the second and third metatarsals.  The foot fractures were nondisplaced.  After

discussion with the patient including risks and benefits, we have decided to proceed

with open reduction and internal fixation of this right bimalleolar ankle fracture.

Informed consent has been obtained.  I believe, all questions have been answered. 



DESCRIPTION OF PROCEDURE:  The patient was brought to the operating room and a

time-out performed followed by induction of general anesthesia.  Next, a sterile

prep and drape was performed of the right lower extremity.  The limb was

exsanguinated with Esmarch bandage, tourniquet inflated to 300 mmHg.  Next, a

vertical incision was made over the distal fibula after which skin was sharply

incised.  Dissection was carried down bluntly exposing the fracture.  Normal saline

was lavaged through wound to remove the fracture hematoma.  The fracture was reduced

and held in place with bone tenaculum and then a 7 hole one-third tubular plate was

applied to the lateral cortex of the distal fibula.  This was held in place with 2

cancellous screws distally and cortical screws proximally.  Once stabilized, AP,

lateral, and mortise images of the ankle were obtained to confirm anatomic alignment

of the fracture.  Next, a small incision was made over the tip of the medial

malleolus after skin was sharply incised.  Dissection was carried down bluntly,

exposing the fracture.  The fracture was reduced and held in place while 2 partially

threaded cancellous screws were passed from the tip of medial malleolus obliquely

across the fracture in the distal tibial metaphysis.  Completion of this final AP,

lateral, and mortise images were obtained that showed anatomic alignment of

fractures, reduction of the mortise and a lateral shift of the talus.  The two

incisions were irrigated with normal saline and then closed in layers with 0 Vicryl

deep, followed by 2-0 Vicryl and nylon for skin.  The 2 foot fractures still were

nondisplaced and as such, a splint that included a posterior fiberglass component

was applied to provide immobilization of these foot fractures.  The tourniquet was

then let down.  The patient was transferred to recovery room in stable condition.

There were no complications.  The patient tolerated the procedure well. 







Job ID:  [id number]

## 2024-05-08 NOTE — ASSESSMENT & PLAN NOTE
Patient has hyponatremia which is uncontrolled,We will aim to correct the sodium by 4-6mEq in 24 hours. We will monitor sodium Daily. The hyponatremia is due to Dehydration/hypovolemia. We will obtain the following studies: Urine sodium, urine osmolality, serum osmolality. We will treat the hyponatremia with IV fluids as follows: NS@75. The patient's sodium results have been reviewed and are listed below.  Recent Labs   Lab 05/08/24  0334   *

## 2024-05-08 NOTE — ASSESSMENT & PLAN NOTE
Chronic, uncontrolled. Latest blood pressure and vitals reviewed-     Temp:  [97.4 °F (36.3 °C)-98.6 °F (37 °C)]   Pulse:  [72-90]   Resp:  [16-22]   BP: (124-176)/(64-91)   SpO2:  [94 %-98 %] .   Home meds for hypertension were reviewed and noted below.   Hypertension Medications               lisinopriL (PRINIVIL,ZESTRIL) 30 MG tablet Take 30 mg by mouth once daily.    metoprolol succinate (TOPROL-XL) 50 MG 24 hr tablet Take 50 mg by mouth once daily.            While in the hospital, will manage blood pressure as follows; Continue home antihypertensive regimen    Will utilize p.r.n. blood pressure medication only if patient's blood pressure greater than 140/90 and she develops symptoms such as worsening chest pain or shortness of breath.

## 2024-05-08 NOTE — PLAN OF CARE
Problem: Physical Therapy  Goal: Physical Therapy Goal  Description: Goals to be met by: 24     Patient will increase functional independence with mobility by performin. Supine to sit with [place]  2. Sit to supine with [place]  3. Sit to stand transfer with Modified [place]  4. Gait  x 300 feet with Modified [place] using Rolling Walker.     Outcome: Progressing

## 2024-05-08 NOTE — CONSULTS
[facility] - Ortho Neuro  Neurosurgery  Consult Note    Inpatient consult to Neurosurgery  Consult performed by: Cristy Landrum NP  Consult ordered by: An Bishop NP        Subjective:     Chief Complaint/Reason for Admission: back pain    History of Present Illness:   Patient is a 89 year old, female with a past medical history of HTN, anxiety, and HLD. She presented to the ED for back pain. She had a recent fall on 24 and came to the ED at that time and was diagnosed with T9 compression fx. Neurosurgery was consulted. Patient wanted to go home and refused further radiology imaging. She was fitted for a TLSO brace and was prescribed Norco 5 1/2 tab every 6 hours and was told to follow up in neurosurgery clinic. She was discharged home. She came back to the ED yesterday due to persistent back pain.     On physical examination, patient is confused. She stated she feels lost and wants to go home. She requests for the provider to call her family so she can go home. She has back pain. She is currently wearing her TLSO brace loosely while lying in bed. She is alert and confused. She follow commands. She move all extremities on command.       PTA Medications   Medication Sig    atorvastatin (LIPITOR) 20 MG tablet Take 20 mg by mouth once daily.    busPIRone (BUSPAR) 7.5 MG tablet Take 7.5 mg by mouth 3 (three) times daily.    [] hydrocodone-acetaminophen (HYCET) solution 7.5-325 mg/15mL Take 5 mLs by mouth every 6 (six) hours as needed for Pain.    levothyroxine (SYNTHROID) 125 MCG tablet Take 125 mcg by mouth before breakfast.    lisinopriL (PRINIVIL,ZESTRIL) 30 MG tablet Take 30 mg by mouth once daily.    metFORMIN (GLUCOPHAGE) 500 MG tablet Take 500 mg by mouth 2 (two) times daily with meals.    metoprolol succinate (TOPROL-XL) 50 MG 24 hr tablet Take 50 mg by mouth once daily.    omeprazole (PRILOSEC OTC) 20 MG tablet Take 20 mg by mouth once daily.    acetaminophen (TYLENOL) 500 MG  tablet Take 1 tablet (500 mg total) by mouth every 6 (six) hours as needed for Pain.    docusate sodium (COLACE) 100 MG capsule Take 1 capsule (100 mg total) by mouth 2 (two) times daily.    meloxicam (MOBIC) 15 MG tablet Take 15 mg by mouth once daily.    polyethylene glycol (GLYCOLAX) 17 gram PwPk Take 17 g by mouth daily as needed for Constipation.       Review of patient's allergies indicates:   Allergen Reactions    Penicillins Itching     rash       Past Medical History:   Diagnosis Date    Anxiety disorder, unspecified     HTN (hypertension)     Mixed hyperlipidemia     Thyroid disease      Past Surgical History:   Procedure Laterality Date    CATARACT EXTRACTION Bilateral     CHOLECYSTECTOMY      HERNIA REPAIR      HYSTERECTOMY      KNEE SURGERY Right      Family History    None       Tobacco Use    Smoking status: Never    Smokeless tobacco: Never   Substance and Sexual Activity    Alcohol use: Not on file    Drug use: Not on file    Sexual activity: Not on file     Review of Systems   Musculoskeletal:  Positive for back pain.     Objective:     Weight: 57.2 kg (126 lb)  Body mass index is 20.97 kg/m².  Vital Signs (Most Recent):  Temp: 98.6 °F (37 °C) (05/08/24 0618)  Pulse: 72 (05/08/24 0618)  Resp: 16 (05/08/24 0618)  BP: 126/72 (05/08/24 0618)  SpO2: 97 % (05/08/24 0618) Vital Signs (24h Range):  Temp:  [97.7 °F (36.5 °C)-98.6 °F (37 °C)] 98.6 °F (37 °C)  Pulse:  [72-90] 72  Resp:  [16-22] 16  SpO2:  [94 %-98 %] 97 %  BP: (124-176)/(64-91) 126/72                              Physical Exam:    Constitutional: She appears well-developed and well-nourished. No distress.     Eyes: Pupils are equal, round, and reactive to light. Conjunctivae and EOM are normal.     Cardiovascular: Normal rate and regular rhythm.     Abdominal: Soft.     Psych/Behavior: She is alert.   Confused     Musculoskeletal:      Comments: Spontaneously move BUE    L knee flexion: 5/5  L dorsiflexion: 5/5  L plantar flexion: 5/5  R  "knee flexion: 5/5  R dorsiflexion: 5/5  R plantar flexion: 5/5     Neurological:   Alert and awake  Confused  Sensation intact BUE, BLE  TLSO brace on but loose       Significant Labs:  Recent Labs   Lab 05/07/24 2127 05/08/24  0334   * 124*   K 4.4 4.4   CO2 25 24   BUN 20.2* 16.4   CREATININE 0.51* 0.52*   CALCIUM 8.4 8.4   MG 1.40* 1.90     Recent Labs   Lab 05/07/24 2127 05/08/24  0334   WBC 15.50* 12.54*   HGB 8.6* 8.5*   HCT 25.8* 25.9*   * 636*     No results for input(s): "LABPT", "INR", "APTT" in the last 48 hours.  Microbiology Results (last 7 days)       ** No results found for the last 168 hours. **            Significant Diagnostics:  CT lumbar spine without contrast on 5/7 demonstrated: The bones are diffusely osteoporotic. The vertebral body heights are well maintained in the lumbar spine. There is a hemangioma at the level of L4. This was seen on the prior examination as well. There is anterolisthesis of L4 on L5 that measures 6 mm. This is stable since 02/13/2024. There is multilevel facet arthropathy seen. No lumbar spine fracture is seen. The upper sacrum appears to be intact. There is mild levoscoliosis of the lumbar spine with apex at L2-L3. There is some evidence of foraminal stenosis at multiple levels in the lower lumbar spine.     CT thoracic spine without contrast on 5/7 demonstrated: T9 compression fracture consistent with vertebral plana with some posterior buckling of the mid fracture fragment.       Assessment/Plan:    Diagnosis: back pain, T9 compression fx    Hx: HTN, anxiety, and HLD    Plan:     - Floor status  - Neuro checks Q4H  - Pain control   - Continue wearing TLSO brace when HOB>30 degrees and OOB, make sure the patient is wearing the brace properly  - PT, OT  - SCDs and Lovenox for DVT prophylaxis    From neurosurgery standpoint, patient does not need any acute surgical intervention. Neurosurgery is signing off. Please reach out for any questions or concerns.  "     Active Diagnoses:    Diagnosis Date Noted POA    PRINCIPAL PROBLEM:  Compression fracture of T9 vertebra [S22.070A] 05/07/2024 Yes      Problems Resolved During this Admission:       Thank you for your consult.     MATTEO Santos- BC  Neurosurgery  [facility]

## 2024-05-08 NOTE — HPI
89 year old female with a pmh of HTN, HLD, DM2, Hypothyroidism, GERD, osteoarthritis, and anxiety who presented to the ED with c/o back pain, not relieved by prescription pain meds.  She had a fall on 5/2 and was seen in this ED and dx with T9 compression fracture. Neurosurgery was consulted, she refused further imaging and wanted to go home. She was fitted for a TLSO brace at that time and told to f/u with neurosurgery and was prescribed Norco 5 1/2 tab every 6 hours. She states that she has continued to have unrelenting pain, so she decided to come back in.     ED vitals on arrival:  Temp 97.7° F, pulse 90, resp 18, /89, SpO2 97% on RA. No lab work performed while in the ED. CT thoracic spine without contrast showed T9 compression fracture consistent with vertebral plana with some posterior buckling of the mid fracture fragment.  Large mass in the left lung apex with a pleural-based area of nodularity seen in the left upper hemothorax as well.  Both of these lesions are larger than the prior examination.  Bilateral pleural effusion slightly worse than the prior examination.  Some mild supraclavicular lymphadenopathy on the right side.  CT lumbar spine without contrast showed chronic changes seen in the lumbar spine.  Neurosurgery was consulted in the ED. She was admitted to Hospital Medicine for management.     Patient knows about the lung mass is not interested in pursuing treatment.     Lab work revealed WBC 15.5, H&H 8.6/25.8, Na 122, Mag 1.4.

## 2024-05-08 NOTE — ED NOTES
"Pt more confused at this time, saying "I am a prisoner. Where is my family". I redirected pt and assured her that she is okay and we are taking care of her. Pt requested I call her daughter Florence. I spoke with Florence and was told she is coming after 9am doctor's appointment. Pt updated at this time. Pt still mildly confused.  "

## 2024-05-08 NOTE — PT/OT/SLP EVAL
Occupational Therapy  Evaluation    Name: Jacques Richmond  MRN: [id number]  Admitting Diagnosis: Back pain   Recent Surgery: * No surgery found *      Recommendations:     Discharge therapy intensity: Moderate Intensity Therapy   Discharge Equipment Recommendations:  walker, rolling  Barriers to discharge:  Decreased caregiver support    Assessment:     Jacques Richmond is a 89 y.o. female with a medical diagnosis of recent T9 compression fx (conservative treatment with TLSO), OA, anxiety.  She presents with the following performance deficits affecting function: impaired cognition, weakness, decreased upper extremity function, impaired endurance, impaired balance, decreased safety awareness, pain, impaired self care skills, impaired functional mobility. Pt not safe at this time to return home 2/2 cognitive status, high fall risk, and impaired mobility and functional status.     Rehab Prognosis: Good; patient would benefit from acute skilled OT services to address these deficits and reach maximum level of function.       Plan:     Patient to be seen 4 x/week to address the above listed problems via self-care/home management, therapeutic exercises, therapeutic activities  Plan of Care Expires: 06/07/24  Plan of Care Reviewed with: patient, daughter    Subjective     Chief Complaint: Pain in back   Patient/Family Comments/goals: Move without pain     Occupational Profile:  Living Environment: Alone but HH comes twice a week to bath and family assists with living tasks   Previous level of function: MI with ADLs and assist required for IADLs   Equipment Used at Home: rollator, shower chair  Assistance upon Discharge: Family reports burden of care is high at this time     Pain/Comfort:  Pain Rating 1: 7/10  Location 1: back  Pain Addressed 1: Pre-medicate for activity, Nurse notified  Pain Rating Post-Intervention 1: 4/10    Patients cultural, spiritual, Congregation conflicts given the current situation: no    Objective:      OT communicated with RN prior to session.      Patient was found left sidelying with peripheral IV, PureWick, SCD, Other (comments) (LSO) upon OT entry to room.    General Precautions: Standard, other (see comments), fall (spinal)  Orthopedic Precautions:    Braces:  (MD recommending TLSO, pt with LSO from prior admission. Brace ordered from [facility])    Vital Signs: WDL    Bed Mobility:    Patient completed Supine to Sit with moderate assistance to log roll     Functional Mobility/Transfers:  Patient completed Sit <> Stand Transfer with minimum assistance  with  rolling walker   Functional Mobility: min A with several LOBs and poor endurance.     Activities of Daily Living:  Feeding:  stand by assistance seated   Grooming: minimum assistance    Upper Body Dressing: moderate assistance    Lower Body Dressing: maximal assistance      Functional Cognition:  Orientation: oriented to Person, Place, and Situation  Command Following: Follows simple one-step commands with 80% accuracy  Safety Awareness: Impaired.      Visual Perceptual Skills:  Pt reports she wears glasses typically, but not present at hospital. Pt repeatedly complaining of double vision during eval    Upper Extremity Function:  Right Upper Extremity:   WFL    Left Upper Extremity:  WFL    Balance:   Impaired.      Therapeutic Positioning  Risk for acquired pressure injuries is significantly increased due to relative decrease in mobility d/t hospitalization .    OT interventions performed during the course of today's session:   Education was provided on benefits of and recommendations for therapeutic positioning    Skin assessment: all bony prominences were assessed    Findings: no redness or breakdown noted    OT recommendations for therapeutic positioning throughout hospitalization:   Follow [facility] Pressure Injury Prevention Protocol    Additional Treatment:  ADL Training: After leaving patient's room pt called for help. Pt eating in chair and  dropped soda and spilt over gown and chux pad. OT assisted with donning/doffing gowns and LSO. Pt made two attempts to don LSO, but unsuccessful. Sit to stand with HHA from chair to change chux.  Pt returned seated and tray repositioned to be closer to patient.     Patient Education:  Patient and daughter/s were provided with verbal education and demonstrations education regarding OT role/goals/POC, fall prevention, and Discharge/DME recommendations.  Understanding was verbalized, however additional teaching warranted.     Patient left up in chair with all lines intact, call button in reach, and daughters present.    GOALS:   Multidisciplinary Problems       Occupational Therapy Goals          Problem: Occupational Therapy    Goal Priority Disciplines Outcome Interventions   Occupational Therapy Goal     OT, PT/OT     Description: LTG: Pt will perform basic ADLs and ADL transfers with Modified independence using LRAD by discharge.    STG: to be met by 6/9    Pt will complete grooming standing at sink with LRAD with SBA.  Pt will complete UB dressing with SBA.  Pt will complete LB dressing with SBA using LRAD.  Pt will complete toileting with SBA using LRAD.  Pt will complete functional mobility to/from toilet and toilet transfer with SBA using LRAD.                        History:     Past Medical History:   Diagnosis Date    Anxiety disorder, unspecified     HTN (hypertension)     Mixed hyperlipidemia     Thyroid disease          Past Surgical History:   Procedure Laterality Date    CATARACT EXTRACTION Bilateral     CHOLECYSTECTOMY      HERNIA REPAIR      HYSTERECTOMY      KNEE SURGERY Right        Time Tracking:     OT Date of Treatment: 05/08/24  OT Start Time: 1248  OT Stop Time: 1315  OT Total Time (min): 27 min    Billable Minutes:Evaluation 17  Self Care/Home Management 10    5/8/2024

## 2024-05-08 NOTE — SUBJECTIVE & OBJECTIVE
Interval History:     Review of Systems   Unable to perform ROS: Dementia   Musculoskeletal:  Positive for back pain.   Objective:     Vital Signs (Most Recent):  Temp: 97.6 °F (36.4 °C) (05/08/24 1109)  Pulse: 77 (05/08/24 1109)  Resp: 18 (05/08/24 1255)  BP: 132/71 (05/08/24 1109)  SpO2: (!) 94 % (05/08/24 1109) Vital Signs (24h Range):  Temp:  [97.4 °F (36.3 °C)-98.6 °F (37 °C)] 97.6 °F (36.4 °C)  Pulse:  [72-90] 77  Resp:  [16-22] 18  SpO2:  [94 %-98 %] 94 %  BP: (124-176)/(64-91) 132/71     Weight: 57.2 kg (126 lb)  Body mass index is 20.97 kg/m².  No intake or output data in the 24 hours ending 05/08/24 1423      Physical Exam  Constitutional:       General: She is awake.      Appearance: Normal appearance. She is normal weight.   HENT:      Head: Normocephalic and atraumatic.      Nose: Nose normal.      Mouth/Throat:      Mouth: Mucous membranes are moist.      Pharynx: Oropharynx is clear.   Eyes:      Extraocular Movements: Extraocular movements intact.      Conjunctiva/sclera: Conjunctivae normal.      Pupils: Pupils are equal, round, and reactive to light.   Cardiovascular:      Rate and Rhythm: Normal rate and regular rhythm.      Pulses: Normal pulses.      Heart sounds: Normal heart sounds.   Pulmonary:      Effort: Pulmonary effort is normal.      Breath sounds: Normal breath sounds.   Abdominal:      General: Bowel sounds are normal.      Palpations: Abdomen is soft.   Musculoskeletal:         General: Normal range of motion.      Cervical back: Normal range of motion and neck supple.   Skin:     General: Skin is warm and dry.      Capillary Refill: Capillary refill takes 2 to 3 seconds.   Neurological:      General: No focal deficit present.      Mental Status: She is oriented to person, place, and time. Mental status is at baseline.   Psychiatric:         Mood and Affect: Affect is angry.         Cognition and Memory: Cognition is impaired. She exhibits impaired recent memory.         Significant  Labs: All pertinent labs within the past 24 hours have been reviewed.  BMP:   Recent Labs   Lab 05/08/24  0334   *   K 4.4   CO2 24   BUN 16.4   CREATININE 0.52*   CALCIUM 8.4   MG 1.90     CBC:   Recent Labs   Lab 05/07/24 2127 05/08/24  0334   WBC 15.50* 12.54*   HGB 8.6* 8.5*   HCT 25.8* 25.9*   * 636*     CMP:   Recent Labs   Lab 05/07/24 2127 05/08/24  0334   * 124*   K 4.4 4.4   CO2 25 24   BUN 20.2* 16.4   CREATININE 0.51* 0.52*   CALCIUM 8.4 8.4   ALBUMIN 2.5* 2.3*   BILITOT 0.3 0.4   ALKPHOS 58 54   AST 30 24   ALT 36 32     Magnesium:   Recent Labs   Lab 05/07/24 2127 05/08/24  0334   MG 1.40* 1.90       Significant Imaging: I have reviewed all pertinent imaging results/findings within the past 24 hours.

## 2024-05-08 NOTE — NURSING
Nurses Note -- 4 Eyes      5/8/2024   8:37 AM      Skin assessed during: Transfer      [x] No Altered Skin Integrity Present    []Prevention Measures Documented      [] Yes- Altered Skin Integrity Present or Discovered   [] LDA Added if Not in Epic (Describe Wound)   [] New Altered Skin Integrity was Present on Admit and Documented in LDA   [] Wound Image Taken    Wound Care Consulted? No    Attending Nurse:  Julio Chapa LPN    Second RN/Staff Member:   Jasmine Mohan RN

## 2024-05-09 LAB
ALBUMIN SERPL-MCNC: 2.4 G/DL (ref 3.4–4.8)
ALBUMIN/GLOB SERPL: 0.6 RATIO (ref 1.1–2)
ALP SERPL-CCNC: 60 UNIT/L (ref 40–150)
ALT SERPL-CCNC: 32 UNIT/L (ref 0–55)
AST SERPL-CCNC: 27 UNIT/L (ref 5–34)
BASOPHILS # BLD AUTO: 0.04 X10(3)/MCL
BASOPHILS NFR BLD AUTO: 0.3 %
BILIRUB SERPL-MCNC: 0.4 MG/DL
BUN SERPL-MCNC: 16.9 MG/DL (ref 9.8–20.1)
CALCIUM SERPL-MCNC: 8.4 MG/DL (ref 8.4–10.2)
CHLORIDE SERPL-SCNC: 91 MMOL/L (ref 98–111)
CO2 SERPL-SCNC: 26 MMOL/L (ref 23–31)
CREAT SERPL-MCNC: 0.62 MG/DL (ref 0.55–1.02)
EOSINOPHIL # BLD AUTO: 0.07 X10(3)/MCL (ref 0–0.9)
EOSINOPHIL NFR BLD AUTO: 0.5 %
ERYTHROCYTE [DISTWIDTH] IN BLOOD BY AUTOMATED COUNT: 16.7 % (ref 11.5–17)
GFR SERPLBLD CREATININE-BSD FMLA CKD-EPI: >60 ML/MIN/1.73/M2
GLOBULIN SER-MCNC: 3.7 GM/DL (ref 2.4–3.5)
GLUCOSE SERPL-MCNC: 124 MG/DL (ref 75–121)
HCT VFR BLD AUTO: 26.9 % (ref 37–47)
HGB BLD-MCNC: 8.6 G/DL (ref 12–16)
IMM GRANULOCYTES # BLD AUTO: 0.08 X10(3)/MCL (ref 0–0.04)
IMM GRANULOCYTES NFR BLD AUTO: 0.6 %
LYMPHOCYTES # BLD AUTO: 1.29 X10(3)/MCL (ref 0.6–4.6)
LYMPHOCYTES NFR BLD AUTO: 9.6 %
MAGNESIUM SERPL-MCNC: 1.8 MG/DL (ref 1.6–2.6)
MCH RBC QN AUTO: 24.2 PG (ref 27–31)
MCHC RBC AUTO-ENTMCNC: 32 G/DL (ref 33–36)
MCV RBC AUTO: 75.8 FL (ref 80–94)
MONOCYTES # BLD AUTO: 0.87 X10(3)/MCL (ref 0.1–1.3)
MONOCYTES NFR BLD AUTO: 6.4 %
NEUTROPHILS # BLD AUTO: 11.14 X10(3)/MCL (ref 2.1–9.2)
NEUTROPHILS NFR BLD AUTO: 82.6 %
NRBC BLD AUTO-RTO: 0 %
PLATELET # BLD AUTO: 641 X10(3)/MCL (ref 130–400)
PMV BLD AUTO: 8.4 FL (ref 7.4–10.4)
POCT GLUCOSE: 122 MG/DL (ref 70–110)
POCT GLUCOSE: 171 MG/DL (ref 70–110)
POCT GLUCOSE: 99 MG/DL (ref 70–110)
POTASSIUM SERPL-SCNC: 4.4 MMOL/L (ref 3.5–5.1)
PROT SERPL-MCNC: 6.1 GM/DL (ref 5.8–7.6)
RBC # BLD AUTO: 3.55 X10(6)/MCL (ref 4.2–5.4)
SODIUM SERPL-SCNC: 125 MMOL/L (ref 136–145)
WBC # SPEC AUTO: 13.49 X10(3)/MCL (ref 4.5–11.5)

## 2024-05-09 PROCEDURE — 11000001 HC ACUTE MED/SURG PRIVATE ROOM

## 2024-05-09 PROCEDURE — 85025 COMPLETE CBC W/AUTO DIFF WBC: CPT | Performed by: INTERNAL MEDICINE

## 2024-05-09 PROCEDURE — 97535 SELF CARE MNGMENT TRAINING: CPT

## 2024-05-09 PROCEDURE — 36415 COLL VENOUS BLD VENIPUNCTURE: CPT | Performed by: INTERNAL MEDICINE

## 2024-05-09 PROCEDURE — 27000221 HC OXYGEN, UP TO 24 HOURS

## 2024-05-09 PROCEDURE — 97116 GAIT TRAINING THERAPY: CPT | Mod: CQ

## 2024-05-09 PROCEDURE — 25000003 PHARM REV CODE 250: Performed by: NURSE PRACTITIONER

## 2024-05-09 PROCEDURE — 80053 COMPREHEN METABOLIC PANEL: CPT | Performed by: INTERNAL MEDICINE

## 2024-05-09 PROCEDURE — 63600175 PHARM REV CODE 636 W HCPCS: Performed by: INTERNAL MEDICINE

## 2024-05-09 PROCEDURE — 97530 THERAPEUTIC ACTIVITIES: CPT | Mod: CQ

## 2024-05-09 PROCEDURE — 25000003 PHARM REV CODE 250: Performed by: INTERNAL MEDICINE

## 2024-05-09 PROCEDURE — 83735 ASSAY OF MAGNESIUM: CPT | Performed by: INTERNAL MEDICINE

## 2024-05-09 PROCEDURE — 63600175 PHARM REV CODE 636 W HCPCS: Performed by: NURSE PRACTITIONER

## 2024-05-09 RX ORDER — HYDRALAZINE HYDROCHLORIDE 20 MG/ML
10 INJECTION INTRAMUSCULAR; INTRAVENOUS EVERY 4 HOURS PRN
Status: DISCONTINUED | OUTPATIENT
Start: 2024-05-09 | End: 2024-05-13 | Stop reason: HOSPADM

## 2024-05-09 RX ORDER — HYDRALAZINE HYDROCHLORIDE 20 MG/ML
10 INJECTION INTRAMUSCULAR; INTRAVENOUS EVERY 4 HOURS PRN
Status: DISCONTINUED | OUTPATIENT
Start: 2024-05-09 | End: 2024-05-09

## 2024-05-09 RX ORDER — HALOPERIDOL 5 MG/ML
2 INJECTION INTRAMUSCULAR ONCE
Status: COMPLETED | OUTPATIENT
Start: 2024-05-09 | End: 2024-05-09

## 2024-05-09 RX ORDER — SODIUM CHLORIDE 1 G/1
1000 TABLET ORAL 2 TIMES DAILY
Status: DISCONTINUED | OUTPATIENT
Start: 2024-05-09 | End: 2024-05-11

## 2024-05-09 RX ADMIN — SODIUM CHLORIDE: 9 INJECTION, SOLUTION INTRAVENOUS at 02:05

## 2024-05-09 RX ADMIN — PANTOPRAZOLE SODIUM 40 MG: 40 TABLET, DELAYED RELEASE ORAL at 08:05

## 2024-05-09 RX ADMIN — SODIUM CHLORIDE 1000 MG: 1 TABLET ORAL at 01:05

## 2024-05-09 RX ADMIN — HYDRALAZINE HYDROCHLORIDE 10 MG: 20 INJECTION INTRAMUSCULAR; INTRAVENOUS at 04:05

## 2024-05-09 RX ADMIN — ACETAMINOPHEN 325MG 650 MG: 325 TABLET ORAL at 04:05

## 2024-05-09 RX ADMIN — SIMETHICONE 80 MG: 80 TABLET, CHEWABLE ORAL at 04:05

## 2024-05-09 RX ADMIN — BUSPIRONE HYDROCHLORIDE 7.5 MG: 7.5 TABLET ORAL at 02:05

## 2024-05-09 RX ADMIN — ACETAMINOPHEN 325MG 325 MG: 325 TABLET ORAL at 09:05

## 2024-05-09 RX ADMIN — ACETAMINOPHEN 325MG 650 MG: 325 TABLET ORAL at 02:05

## 2024-05-09 RX ADMIN — ACETAMINOPHEN 325MG 650 MG: 325 TABLET ORAL at 08:05

## 2024-05-09 RX ADMIN — ATORVASTATIN CALCIUM 20 MG: 10 TABLET, FILM COATED ORAL at 08:05

## 2024-05-09 RX ADMIN — HALOPERIDOL LACTATE 2 MG: 5 INJECTION, SOLUTION INTRAMUSCULAR at 07:05

## 2024-05-09 RX ADMIN — BUSPIRONE HYDROCHLORIDE 7.5 MG: 7.5 TABLET ORAL at 09:05

## 2024-05-09 RX ADMIN — ENOXAPARIN SODIUM 40 MG: 40 INJECTION SUBCUTANEOUS at 04:05

## 2024-05-09 RX ADMIN — SODIUM CHLORIDE 1000 MG: 1 TABLET ORAL at 09:05

## 2024-05-09 RX ADMIN — BUSPIRONE HYDROCHLORIDE 7.5 MG: 7.5 TABLET ORAL at 08:05

## 2024-05-09 RX ADMIN — INSULIN ASPART 2 UNITS: 100 INJECTION, SOLUTION INTRAVENOUS; SUBCUTANEOUS at 11:05

## 2024-05-09 RX ADMIN — HYDRALAZINE HYDROCHLORIDE 10 MG: 20 INJECTION INTRAMUSCULAR; INTRAVENOUS at 11:05

## 2024-05-09 RX ADMIN — LISINOPRIL 30 MG: 20 TABLET ORAL at 08:05

## 2024-05-09 RX ADMIN — LEVOTHYROXINE SODIUM 125 MCG: 125 TABLET ORAL at 07:05

## 2024-05-09 RX ADMIN — METOPROLOL SUCCINATE 50 MG: 50 TABLET, EXTENDED RELEASE ORAL at 08:05

## 2024-05-09 NOTE — NURSING
Presents with more confusion and agitation.  D/C IV. Insisted on speaking with daughter earlier, which seemed to help calm her.  Has not slept-constantly calling, talking, and screaming now.  Percocet not given, only Tylenol and Melatonin.  Awaiting response from MD. Linda Wheeler RN

## 2024-05-09 NOTE — PROGRESS NOTES
[facility] Medicine Progress Note        Chief Complaint: Inpatient Follow-up for     HPI: 89 year old female with a pmh of HTN, HLD, DM2, Hypothyroidism, GERD, osteoarthritis, and anxiety who presented to the ED with c/o back pain, not relieved by prescription pain meds.  She had a fall on 5/2 and was seen in this ED and dx with T9 compression fracture. Neurosurgery was consulted, she refused further imaging and wanted to go home. She was fitted for a TLSO brace at that time and told to f/u with neurosurgery and was prescribed Norco 5 1/2 tab every 6 hours. She states that she has continued to have unrelenting pain, so she decided to come back in.     ED vitals on arrival:  Temp 97.7° F, pulse 90, resp 18, /89, SpO2 97% on RA. No lab work performed while in the ED. CT thoracic spine without contrast showed T9 compression fracture consistent with vertebral plana with some posterior buckling of the mid fracture fragment.  Large mass in the left lung apex with a pleural-based area of nodularity seen in the left upper hemothorax as well.  Both of these lesions are larger than the prior examination.  Bilateral pleural effusion slightly worse than the prior examination.  Some mild supraclavicular lymphadenopathy on the right side.  CT lumbar spine without contrast showed chronic changes seen in the lumbar spine.  Neurosurgery was consulted in the ED. She was admitted to Hospital Medicine for management.    Interval Hx:   Patient seen and examined this morning with patient seen and examined this morning with patient's daughters bedside did complain of some pain sodium is slightly better daughter reported that she take salt tablets but the home health nurse stopped  Case was discussed with patient's nurse and  on the floor.    Objective/physical exam:  General: In no acute distress, afebrile  Chest: Clear to auscultation bilaterally  Heart: RRR, +S1, S2, no  appreciable murmur  Abdomen: Soft, nontender, BS +  MSK: Warm, no lower extremity edema, no clubbing or cyanosis  Neurologic: Alert and oriented x4, s    VITAL SIGNS: 24 HRS MIN & MAX LAST   Temp  Min: 97.5 °F (36.4 °C)  Max: 98.4 °F (36.9 °C) 97.7 °F (36.5 °C)   BP  Min: 124/75  Max: 177/89 (!) 177/89   Pulse  Min: 69  Max: 93  93   Resp  Min: 17  Max: 20 18   SpO2  Min: 90 %  Max: 97 % (!) 94 %     I have reviewed the following labs:  Recent Labs   Lab 05/07/24 2127 05/08/24 0334 05/09/24 0443   WBC 15.50* 12.54* 13.49*   RBC 3.51* 3.44* 3.55*   HGB 8.6* 8.5* 8.6*   HCT 25.8* 25.9* 26.9*   MCV 73.5* 75.3* 75.8*   MCH 24.5* 24.7* 24.2*   MCHC 33.3 32.8* 32.0*   RDW 16.5 16.6 16.7   * 636* 641*   MPV 8.3 8.6 8.4     Recent Labs   Lab 05/07/24 2127 05/08/24 0334 05/09/24  0443   * 124* 125*   K 4.4 4.4 4.4   CO2 25 24 26   BUN 20.2* 16.4 16.9   CREATININE 0.51* 0.52* 0.62   CALCIUM 8.4 8.4 8.4   MG 1.40* 1.90 1.80   ALBUMIN 2.5* 2.3* 2.4*   ALKPHOS 58 54 60   ALT 36 32 32   AST 30 24 27   BILITOT 0.3 0.4 0.4     Microbiology Results (last 7 days)       ** No results found for the last 168 hours. **             See below for Radiology    Scheduled Med:   atorvastatin  20 mg Oral Daily    busPIRone  7.5 mg Oral TID    enoxparin  40 mg Subcutaneous Daily    levothyroxine  125 mcg Oral Before breakfast    lisinopriL  30 mg Oral Daily    metoprolol succinate  50 mg Oral Daily    pantoprazole  40 mg Oral Daily      Continuous Infusions:   sodium chloride 0.9%   Intravenous Continuous 75 mL/hr at 05/08/24 2303 New Bag at 05/08/24 2303      PRN Meds:    Current Facility-Administered Medications:     acetaminophen, 650 mg, Oral, Q6H PRN    aluminum-magnesium hydroxide-simethicone, 30 mL, Oral, QID PRN    dextrose 10%, 12.5 g, Intravenous, PRN    dextrose 10%, 25 g, Intravenous, PRN    glucagon (human recombinant), 1 mg, Intramuscular, PRN    glucose, 16 g, Oral, PRN    glucose, 24 g, Oral, PRN    insulin  aspart U-100, 0-10 Units, Subcutaneous, QID (AC + HS) PRN    melatonin, 6 mg, Oral, Nightly PRN    morphine, 4 mg, Intravenous, Q4H PRN    naloxone, 0.02 mg, Intravenous, PRN    ondansetron, 4 mg, Intravenous, Q4H PRN    oxyCODONE-acetaminophen, 1 tablet, Oral, Q4H PRN    polyethylene glycol, 17 g, Oral, BID PRN    prochlorperazine, 5 mg, Intravenous, Q6H PRN    simethicone, 1 tablet, Oral, QID PRN     Assessment/Plan:  T9 compression fracture   Hyponatremia   Left upper lobe lung mass   History of SIADH   Chronic microcytic anemia  Hypertension  Hyperlipidemia  Diabetes mellitus type 2   Hypothyroidism  GERD  Osteoarthritis   Anxiety    Continue with TSL a brace, neurosurgery consulted they recommended outpatient follow up in 4-6 weeks   Continue with pain control   Continue with PT and OT   Will start on staff tablets   Reduce the rate of IV fluids   Continue with other home medications  Case management is working on placement    VTE prophylaxis:     Patient condition:  Stable/Fair/Guarded/ Serious/ Critical    Anticipated discharge and Disposition:         All diagnosis and differential diagnosis have been reviewed; assessment and plan has been documented; I have personally reviewed the labs and test results that are presently available; I have reviewed the patients medication list; I have reviewed the consulting providers response and recommendations. I have reviewed or attempted to review medical records based upon their availability    All of the patient's questions have been  addressed and answered. Patient's is agreeable to the above stated plan. I will continue to monitor closely and make adjustments to medical management as needed.  _____________________________________________________________________    Nutrition Status:    Radiology:  I have personally reviewed the following imaging and agree with the radiologist.     CT Thoracic Spine Without Contrast  Narrative: EXAMINATION:  CT THORACIC SPINE WITHOUT  CONTRAST    CLINICAL HISTORY:  Compression fracture, thoracic;    TECHNIQUE:  Multiple axial images were obtained of the thoracic spine and sagittal and coronal reconstructions were performed.  Automatic exposure control (AEC) is utilized to reduce patient radiation exposure.    COMPARISON:  CT scan of the chest abdomen pelvis from 02/13/2024.    FINDINGS:  The bones are diffusely osteoporotic.  There is a compression fracture seen at the level of T9.  It is new since the prior examination.  There is evidence of vertebral plana.  There is mild posterior buckling of the mid fracture fragment.  No obvious spinal stenosis is seen on this examination.  No other fractures are seen.    There is a large mass in the left upper lobe of the lung.  It is larger than the prior examination.  There is a pleural base lesion seen in the left upper hemithorax which appears slightly more prominent..  Visualized portion of the ribs show no acute fracture.  There are bilateral pleural effusions and bibasilar atelectasis.  This is worse than the prior examination.  There is some supraclavicular lymphadenopathy seen on the right side.  The chest is incompletely evaluated on this examination.  Impression: T9 compression fracture consistent with vertebral plana with some posterior buckling of the mid fracture fragment.    Large mass in the left lung apex with a pleural based area of nodularity seen in the left upper hemithorax as well.  Both of these lesions are larger than the prior examination    Bilateral pleural effusions slightly worse than the prior examination some mild supraclavicular lymphadenopathy on the right side.  The chest findings are incompletely evaluated on this CT scan of the thoracic spine..    This report was flagged in Epic as abnormal.    Electronically signed by: Lucien Parker  Date:    05/07/2024  Time:    18:19  CT Lumbar Spine Without Contrast  Narrative: EXAMINATION:  CT LUMBAR SPINE WITHOUT  CONTRAST    CLINICAL HISTORY:  Low back pain, trauma;    TECHNIQUE:  Low-dose axial images with sagittal and coronal reformations are obtained through the lumbar spine.  Contrast was not administered.  Automatic exposure control (AEC) is utilized to reduce patient radiation exposure.    COMPARISON:  CT scan of the chest abdomen pelvis from 02/13/2024    FINDINGS:  The bones are diffusely osteoporotic.  The vertebral body heights are well maintained in the lumbar spine.  There is a hemangioma at the level of L4.  This was seen on the prior examination as well.  There is anterolisthesis of L4 on L5 that measures 6 mm.  This is stable since 02/13/2024.  There is multilevel facet arthropathy seen.  No lumbar spine fracture is seen.  The upper sacrum appears to be intact.  There is mild levoscoliosis of the lumbar spine with apex at L2-L3.  There is some evidence of foraminal stenosis at multiple levels in the lower lumbar spine.    No soft tissue abnormality seen.    Lower thoracic region shows some bilateral pleural effusions and right lower lobe atelectasis which are incompletely evaluated on this examination.  Impression: Chronic changes seen in the lumbar spine as outlined above    Areas of small effusions and atelectasis in the lung bases which is not well evaluated on this examination.    Electronically signed by: Lucien Parker  Date:    05/07/2024  Time:    18:05      Kassandra Corona MD  Department of Hospital Medicine   [facility]   05/09/2024

## 2024-05-09 NOTE — PLAN OF CARE
Updates sent to [facility] and [facility] via [facility]. Will f/u with admissions this AM to further discuss SNF referral.     Shruti Bishop, LCSW    4883 Attempted to f/u with admissions at [facility], but they are in staff meeting. Will call back later.     1100 Attempted to contact [facility] admissions again, but had to leave VM.     1255 Attempted to contact [facility] admissions again, but had to leave VM. Contacted Denise with [facility] admissions and confirmed they received referral and would have admin review.    1400 S/w Madelyn at [facility] who stated admissions is completing bedside eval with pt today.

## 2024-05-09 NOTE — PT/OT/SLP PROGRESS
Occupational Therapy   Treatment    Name: Jacques Richmond  MRN: [id number]  Admitting Diagnosis:  Compression fracture of T9 vertebra       Recommendations:     Recommended therapy intensity at discharge: Moderate Intensity Therapy   Discharge Equipment Recommendations:  walker, rolling      Assessment:     Jacques Richmond is a 89 y.o. female with a medical diagnosis of T9 compression fx, hyponatremia, hx of SIADH, anxiety, L upper lung mass, anemia.  She presents with PT team present on toilet.  Pt then agreeable to participate in OT.  Performed well but with some anxiety and decreased safety.  Required reassurance and explanation.  Anticipate mod intensity therapy upon dc.  RW and BSC ordered. Performance deficits affecting function are weakness, decreased upper extremity function, impaired endurance, impaired balance, decreased safety awareness, pain, impaired self care skills, impaired functional mobility.     Rehab Prognosis:  Good; patient would benefit from acute skilled OT services to address these deficits and reach maximum level of function.       Plan:     Patient to be seen 4 x/week to address the above listed problems via self-care/home management, therapeutic exercises, therapeutic activities  Plan of Care Expires: 06/07/24  Plan of Care Reviewed with: patient, daughter    Subjective     Pain/Comfort:  Pain Rating 1: 6/10 (end of session, RN to administer meds)    Objective:     Communicated with: RN prior to session.  Patient found ambulatory in room/schaeffer with TLSO, therapy team upon OT entry to room.    General Precautions: Standard, fall    Orthopedic Precautions:   Braces:  (TLSO HOB greater than 30 degrees and OOB)  Respiratory Status: Room air       Occupational Performance:         Functional Mobility/Transfers:  Patient completed Sit <> Stand Transfer with minimum assistance  with  rolling walker   Functional Mobility: RW and TLSO    Activities of Daily Living:  Grooming: minimum assistance  standing at sink for denture cleaning, fatigued and needing rest break  Lower Body Dressing: stand by assistance donning R sock, min A donning L sock.  Hx of L knee issues therefore difficulty with figure 4, cues for back pxns provided    Therapeutic Positioning  Patient Education:  Patient provided with verbal education education regarding OT role/goals/POC, fall prevention, and Discharge/DME recommendations.  Understanding was verbalized, however additional teaching warranted.      Patient left up in chair with all lines intact and chair alarm and posey vest, daughters present .    GOALS:   Multidisciplinary Problems       Occupational Therapy Goals          Problem: Occupational Therapy    Goal Priority Disciplines Outcome Interventions   Occupational Therapy Goal     OT, PT/OT     Description: LTG: Pt will perform basic ADLs and ADL transfers with Modified independence using LRAD by discharge.    STG: to be met by 6/9    Pt will complete grooming standing at sink with LRAD with SBA.  Pt will complete UB dressing with SBA.  Pt will complete LB dressing with SBA using LRAD.  Pt will complete toileting with SBA using LRAD.  Pt will complete functional mobility to/from toilet and toilet transfer with SBA using LRAD.                        Time Tracking:     OT Date of Treatment:    OT Start Time: 1356  OT Stop Time: 1406  OT Total Time (min): 10 min    Billable Minutes:Self Care/Home Management 1          Number of DANNY visits since last OT visit: 1    5/9/2024

## 2024-05-09 NOTE — PT/OT/SLP PROGRESS
Physical Therapy Treatment    Patient Name:  Jacques Richmond   MRN:  [id number]    Recommendations:     Discharge therapy intensity: Moderate Intensity Therapy   Discharge Equipment Recommendations: walker, rolling  Barriers to discharge: Impaired mobility and impaired cognition     Assessment:     Jacques Richmond is a 89 y.o. female admitted with a medical diagnosis of fall. Found to have a T9 compression fracture. She was discharged home with a brace. She presented back to ED on 5/7 with uncontrolled pain. .  She presents with the following impairments/functional limitations: weakness, impaired endurance, impaired self care skills, impaired functional mobility, gait instability, impaired balance, decreased safety awareness, pain .    Pt required max/repetitive vcs for all activities to ensure safety. Remains a high fall risk.     Rehab Prognosis: Good; patient would benefit from acute skilled PT services to address these deficits and reach maximum level of function.    Recent Surgery: * No surgery found *      Plan:     During this hospitalization, patient would benefit from acute PT services 5 x/week to address the identified rehab impairments via gait training, therapeutic activities, therapeutic exercises, neuromuscular re-education and progress toward the following goals:    Plan of Care Expires:  06/08/24    Subjective     Chief Complaint:   Patient/Family Comments/goals:   Pain/Comfort:  Pain Rating 1: 0/10      Objective:     Communicated with NSG prior to session.  Patient found HOB elevated with peripheral IV, PureWick, bed alarm, roll belt upon PT entry to room.     General Precautions: Standard, fall  Orthopedic Precautions: spinal precautions  Braces: TLSO  Respiratory Status: Room air  Blood Pressure:   Skin Integrity: Visible skin intact      Functional Mobility:  Bed Mobility:     Scooting: contact guard assistance  Supine to Sit: contact guard assistance  Transfers:     Sit to Stand:  minimum  assistance and moderate assistance with rolling walker and 1 trial from EOB, 1 trial from toilet, 1 trial from bedside chair. Pt varied min-modA  Toilet Transfer: minimum assistance with  rolling walker  using  Step Transfer and pt very steady/unsafe and required max vcs to fully turn and sit on toilet.   Gait: pt amb 10ft/10ft/62ft with RW Kristyn. Pt with unsteady gait and required steadying assistance as well as assist with walker management. Pt with step-to gait pattern.        Patient left up in chair with all lines intact, call button in reach, and chair alarm on    GOALS:   Multidisciplinary Problems       Physical Therapy Goals          Problem: Physical Therapy    Goal Priority Disciplines Outcome Goal Variances Interventions   Physical Therapy Goal     PT, PT/OT Progressing     Description: Goals to be met by: 24     Patient will increase functional independence with mobility by performin. Supine to sit with Carmen  2. Sit to supine with Carmen  3. Sit to stand transfer with Modified Carmen  4. Gait  x 300 feet with Modified Carmen using Rolling Walker.                          Time Tracking:     PT Received On: 24  PT Start Time: 1343     PT Stop Time: 1406  PT Total Time (min): 23 min     Billable Minutes: Gait Training 15 and Therapeutic Activity 8    Treatment Type: Treatment  PT/PTA: PTA     Number of PTA visits since last PT visit: 2024

## 2024-05-09 NOTE — PLAN OF CARE
[facility] uploaded [id number] to [facility].

## 2024-05-10 LAB
ANION GAP SERPL CALC-SCNC: 7 MEQ/L
BACTERIA #/AREA URNS AUTO: ABNORMAL /HPF
BASOPHILS # BLD AUTO: 0.03 X10(3)/MCL
BASOPHILS NFR BLD AUTO: 0.2 %
BILIRUB UR QL STRIP.AUTO: NEGATIVE
BUN SERPL-MCNC: 12.5 MG/DL (ref 9.8–20.1)
CALCIUM SERPL-MCNC: 8.8 MG/DL (ref 8.4–10.2)
CHLORIDE SERPL-SCNC: 91 MMOL/L (ref 98–111)
CLARITY UR: CLEAR
CO2 SERPL-SCNC: 27 MMOL/L (ref 23–31)
COLOR UR AUTO: ABNORMAL
CREAT SERPL-MCNC: 0.57 MG/DL (ref 0.55–1.02)
CREAT/UREA NIT SERPL: 22
EOSINOPHIL # BLD AUTO: 0.02 X10(3)/MCL (ref 0–0.9)
EOSINOPHIL NFR BLD AUTO: 0.1 %
ERYTHROCYTE [DISTWIDTH] IN BLOOD BY AUTOMATED COUNT: 17 % (ref 11.5–17)
GFR SERPLBLD CREATININE-BSD FMLA CKD-EPI: >60 ML/MIN/1.73/M2
GLUCOSE SERPL-MCNC: 154 MG/DL (ref 75–121)
GLUCOSE UR QL STRIP: NORMAL
HCT VFR BLD AUTO: 32.3 % (ref 37–47)
HGB BLD-MCNC: 10.2 G/DL (ref 12–16)
HGB UR QL STRIP: NEGATIVE
IMM GRANULOCYTES # BLD AUTO: 0.1 X10(3)/MCL (ref 0–0.04)
IMM GRANULOCYTES NFR BLD AUTO: 0.7 %
KETONES UR QL STRIP: ABNORMAL
LEUKOCYTE ESTERASE UR QL STRIP: 75
LYMPHOCYTES # BLD AUTO: 1.27 X10(3)/MCL (ref 0.6–4.6)
LYMPHOCYTES NFR BLD AUTO: 8.3 %
MCH RBC QN AUTO: 23.8 PG (ref 27–31)
MCHC RBC AUTO-ENTMCNC: 31.6 G/DL (ref 33–36)
MCV RBC AUTO: 75.3 FL (ref 80–94)
MONOCYTES # BLD AUTO: 0.48 X10(3)/MCL (ref 0.1–1.3)
MONOCYTES NFR BLD AUTO: 3.1 %
MUCOUS THREADS URNS QL MICRO: ABNORMAL /LPF
NEUTROPHILS # BLD AUTO: 13.46 X10(3)/MCL (ref 2.1–9.2)
NEUTROPHILS NFR BLD AUTO: 87.6 %
NITRITE UR QL STRIP: NEGATIVE
NRBC BLD AUTO-RTO: 0 %
PH UR STRIP: 6.5 [PH]
PLATELET # BLD AUTO: 777 X10(3)/MCL (ref 130–400)
PMV BLD AUTO: 8.3 FL (ref 7.4–10.4)
POCT GLUCOSE: 103 MG/DL (ref 70–110)
POCT GLUCOSE: 139 MG/DL (ref 70–110)
POCT GLUCOSE: 160 MG/DL (ref 70–110)
POTASSIUM SERPL-SCNC: 4 MMOL/L (ref 3.5–5.1)
PROT UR QL STRIP: ABNORMAL
RBC # BLD AUTO: 4.29 X10(6)/MCL (ref 4.2–5.4)
RBC #/AREA URNS AUTO: ABNORMAL /HPF
SODIUM SERPL-SCNC: 125 MMOL/L (ref 136–145)
SP GR UR STRIP.AUTO: 1.01 (ref 1–1.03)
SQUAMOUS #/AREA URNS LPF: ABNORMAL /HPF
UROBILINOGEN UR STRIP-ACNC: NORMAL
WBC # SPEC AUTO: 15.36 X10(3)/MCL (ref 4.5–11.5)
WBC #/AREA URNS AUTO: ABNORMAL /HPF

## 2024-05-10 PROCEDURE — 80048 BASIC METABOLIC PNL TOTAL CA: CPT | Performed by: INTERNAL MEDICINE

## 2024-05-10 PROCEDURE — 27000221 HC OXYGEN, UP TO 24 HOURS

## 2024-05-10 PROCEDURE — 11000001 HC ACUTE MED/SURG PRIVATE ROOM

## 2024-05-10 PROCEDURE — 25000003 PHARM REV CODE 250: Performed by: NURSE PRACTITIONER

## 2024-05-10 PROCEDURE — 99900035 HC TECH TIME PER 15 MIN (STAT)

## 2024-05-10 PROCEDURE — 63600175 PHARM REV CODE 636 W HCPCS: Performed by: NURSE PRACTITIONER

## 2024-05-10 PROCEDURE — 85025 COMPLETE CBC W/AUTO DIFF WBC: CPT | Performed by: INTERNAL MEDICINE

## 2024-05-10 PROCEDURE — 87040 BLOOD CULTURE FOR BACTERIA: CPT | Performed by: INTERNAL MEDICINE

## 2024-05-10 PROCEDURE — 25000003 PHARM REV CODE 250: Performed by: INTERNAL MEDICINE

## 2024-05-10 PROCEDURE — 36415 COLL VENOUS BLD VENIPUNCTURE: CPT | Performed by: INTERNAL MEDICINE

## 2024-05-10 PROCEDURE — 81001 URINALYSIS AUTO W/SCOPE: CPT | Performed by: INTERNAL MEDICINE

## 2024-05-10 PROCEDURE — 63600175 PHARM REV CODE 636 W HCPCS: Performed by: INTERNAL MEDICINE

## 2024-05-10 PROCEDURE — 97116 GAIT TRAINING THERAPY: CPT | Mod: CQ

## 2024-05-10 RX ORDER — METHOCARBAMOL 500 MG/1
500 TABLET, FILM COATED ORAL ONCE
Status: COMPLETED | OUTPATIENT
Start: 2024-05-10 | End: 2024-05-10

## 2024-05-10 RX ORDER — OXYCODONE AND ACETAMINOPHEN 5; 325 MG/1; MG/1
1 TABLET ORAL EVERY 6 HOURS PRN
Status: DISCONTINUED | OUTPATIENT
Start: 2024-05-10 | End: 2024-05-12

## 2024-05-10 RX ORDER — LEVOFLOXACIN 5 MG/ML
500 INJECTION, SOLUTION INTRAVENOUS
Status: DISCONTINUED | OUTPATIENT
Start: 2024-05-10 | End: 2024-05-13 | Stop reason: HOSPADM

## 2024-05-10 RX ORDER — ALPRAZOLAM 0.25 MG/1
0.25 TABLET ORAL 2 TIMES DAILY PRN
Status: DISCONTINUED | OUTPATIENT
Start: 2024-05-10 | End: 2024-05-13 | Stop reason: HOSPADM

## 2024-05-10 RX ADMIN — ATORVASTATIN CALCIUM 20 MG: 10 TABLET, FILM COATED ORAL at 08:05

## 2024-05-10 RX ADMIN — BUSPIRONE HYDROCHLORIDE 7.5 MG: 7.5 TABLET ORAL at 03:05

## 2024-05-10 RX ADMIN — ACETAMINOPHEN 325MG 650 MG: 325 TABLET ORAL at 04:05

## 2024-05-10 RX ADMIN — SODIUM CHLORIDE 1000 MG: 1 TABLET ORAL at 08:05

## 2024-05-10 RX ADMIN — BUSPIRONE HYDROCHLORIDE 7.5 MG: 7.5 TABLET ORAL at 08:05

## 2024-05-10 RX ADMIN — OXYCODONE HYDROCHLORIDE AND ACETAMINOPHEN 1 TABLET: 5; 325 TABLET ORAL at 05:05

## 2024-05-10 RX ADMIN — ENOXAPARIN SODIUM 40 MG: 40 INJECTION SUBCUTANEOUS at 04:05

## 2024-05-10 RX ADMIN — METOPROLOL SUCCINATE 50 MG: 50 TABLET, EXTENDED RELEASE ORAL at 08:05

## 2024-05-10 RX ADMIN — INSULIN ASPART 2 UNITS: 100 INJECTION, SOLUTION INTRAVENOUS; SUBCUTANEOUS at 11:05

## 2024-05-10 RX ADMIN — PANTOPRAZOLE SODIUM 40 MG: 40 TABLET, DELAYED RELEASE ORAL at 08:05

## 2024-05-10 RX ADMIN — METHOCARBAMOL 500 MG: 500 TABLET ORAL at 01:05

## 2024-05-10 RX ADMIN — LISINOPRIL 30 MG: 20 TABLET ORAL at 08:05

## 2024-05-10 RX ADMIN — OXYCODONE HYDROCHLORIDE AND ACETAMINOPHEN 1 TABLET: 5; 325 TABLET ORAL at 12:05

## 2024-05-10 RX ADMIN — HYDRALAZINE HYDROCHLORIDE 10 MG: 20 INJECTION INTRAMUSCULAR; INTRAVENOUS at 03:05

## 2024-05-10 RX ADMIN — LEVOFLOXACIN 500 MG: 500 INJECTION, SOLUTION INTRAVENOUS at 11:05

## 2024-05-10 RX ADMIN — ACETAMINOPHEN 325MG 650 MG: 325 TABLET ORAL at 10:05

## 2024-05-10 NOTE — NURSING
Pt has had a difficult night.  She started sundowning early in the shift and frequently attempted to get out of bed.  At one point robaxin was given in case she had muscle spasms, it did allow her to rest for a few hours and around 5 am she started attempting to get out of bed again.  Offered pain meds and refused.  Frequently;y pulled her purewick off.  Somewhat difficult to redirect.

## 2024-05-10 NOTE — PLAN OF CARE
Clinical updates sent to [facility] and [facility] via [facility]. Sw s/w Shira from [facility] yesterday afternoon. Stated she completed bedside eval and will update me this AM if they are able to accept pt.     Shruti Bishop, LCSW     0845 Received call from Tiffany at [facility] stating pt has been approved to admit to [facility] today. MD notified.     0950 Notified that pt will not be cleared to d/c today due to Na levels. Left VM for Tiffany at [facility] to update. Possible dc Monday if cleared.     1030 Received call back from Tiffany. Plan for pt to admit to [facility] Monday if medically cleared.    1035 Met with dtrs and provided update.

## 2024-05-10 NOTE — PROGRESS NOTES
[facility] Hospital Medicine Progress Note        Chief Complaint: Inpatient Follow-up for     HPI: 89 year old female with a pmh of HTN, HLD, DM2, Hypothyroidism, GERD, osteoarthritis, and anxiety who presented to the ED with c/o back pain, not relieved by prescription pain meds.  She had a fall on 5/2 and was seen in this ED and dx with T9 compression fracture. Neurosurgery was consulted, she refused further imaging and wanted to go home. She was fitted for a TLSO brace at that time and told to f/u with neurosurgery and was prescribed Norco 5 1/2 tab every 6 hours. She states that she has continued to have unrelenting pain, so she decided to come back in.     ED vitals on arrival:  Temp 97.7° F, pulse 90, resp 18, /89, SpO2 97% on RA. No lab work performed while in the ED. CT thoracic spine without contrast showed T9 compression fracture consistent with vertebral plana with some posterior buckling of the mid fracture fragment.  Large mass in the left lung apex with a pleural-based area of nodularity seen in the left upper hemothorax as well.  Both of these lesions are larger than the prior examination.  Bilateral pleural effusion slightly worse than the prior examination.  Some mild supraclavicular lymphadenopathy on the right side.  CT lumbar spine without contrast showed chronic changes seen in the lumbar spine.  Neurosurgery was consulted in the ED. She was admitted to Hospital Medicine for management.    Interval Hx:   Patient seen and examined this morning family member bedside who reported that patient get agitated at night and they give her Xanax as needed.  This morning patient's white cell count is trending up and sodium is still low  Objective/physical exam:  General: In no acute distress, afebrile  Chest: Clear to auscultation bilaterally  Heart: RRR, +S1, S2, no appreciable murmur  Abdomen: Soft, nontender, BS +  MSK: Warm, no lower extremity edema, no clubbing or  cyanosis  Neurologic: Alert and oriented x4, s    VITAL SIGNS: 24 HRS MIN & MAX LAST   Temp  Min: 97.5 °F (36.4 °C)  Max: 98.3 °F (36.8 °C) 97.7 °F (36.5 °C)   BP  Min: 130/77  Max: 190/93 (!) 171/79   Pulse  Min: 81  Max: 108  108   Resp  Min: 17  Max: 20 18   SpO2  Min: 95 %  Max: 97 % 95 %     I have reviewed the following labs:  Recent Labs   Lab 05/07/24 2127 05/08/24 0334 05/09/24 0443   WBC 15.50* 12.54* 13.49*   RBC 3.51* 3.44* 3.55*   HGB 8.6* 8.5* 8.6*   HCT 25.8* 25.9* 26.9*   MCV 73.5* 75.3* 75.8*   MCH 24.5* 24.7* 24.2*   MCHC 33.3 32.8* 32.0*   RDW 16.5 16.6 16.7   * 636* 641*   MPV 8.3 8.6 8.4     Recent Labs   Lab 05/07/24 2127 05/08/24 0334 05/09/24 0443   * 124* 125*   K 4.4 4.4 4.4   CO2 25 24 26   BUN 20.2* 16.4 16.9   CREATININE 0.51* 0.52* 0.62   CALCIUM 8.4 8.4 8.4   MG 1.40* 1.90 1.80   ALBUMIN 2.5* 2.3* 2.4*   ALKPHOS 58 54 60   ALT 36 32 32   AST 30 24 27   BILITOT 0.3 0.4 0.4     Microbiology Results (last 7 days)       ** No results found for the last 168 hours. **             See below for Radiology    Scheduled Med:   atorvastatin  20 mg Oral Daily    busPIRone  7.5 mg Oral TID    enoxparin  40 mg Subcutaneous Daily    levothyroxine  125 mcg Oral Before breakfast    lisinopriL  30 mg Oral Daily    metoprolol succinate  50 mg Oral Daily    pantoprazole  40 mg Oral Daily    sodium chloride  1,000 mg Oral BID      Continuous Infusions:   sodium chloride 0.9%   Intravenous Continuous 50 mL/hr at 05/09/24 1450 New Bag at 05/09/24 1450      PRN Meds:    Current Facility-Administered Medications:     acetaminophen, 650 mg, Oral, Q6H PRN    aluminum-magnesium hydroxide-simethicone, 30 mL, Oral, QID PRN    dextrose 10%, 12.5 g, Intravenous, PRN    dextrose 10%, 25 g, Intravenous, PRN    glucagon (human recombinant), 1 mg, Intramuscular, PRN    glucose, 16 g, Oral, PRN    glucose, 24 g, Oral, PRN    hydrALAZINE, 10 mg, Intravenous, Q4H PRN    insulin aspart U-100, 0-10 Units,  Subcutaneous, QID (AC + HS) PRN    melatonin, 6 mg, Oral, Nightly PRN    morphine, 4 mg, Intravenous, Q4H PRN    naloxone, 0.02 mg, Intravenous, PRN    ondansetron, 4 mg, Intravenous, Q4H PRN    oxyCODONE-acetaminophen, 1 tablet, Oral, Q4H PRN    polyethylene glycol, 17 g, Oral, BID PRN    prochlorperazine, 5 mg, Intravenous, Q6H PRN    simethicone, 1 tablet, Oral, QID PRN     Assessment/Plan:  T9 compression fracture   Hyponatremia   Leukocytosis  Left upper lobe lung mass   History of SIADH   Chronic microcytic anemia  Hypertension  Hyperlipidemia  Diabetes mellitus type 2   Hypothyroidism  GERD  Osteoarthritis   Anxiety    White cell count is trending up we will get blood cultures, UA and chest x-ray and will start on Zosyn for now   Sodium is still low we started her on salt tablets Will order urine plasma osmolality and urinary sodium  Continue with TSL a brace, neurosurgery consulted they recommended outpatient follow up in 4-6 weeks   Continue with pain control   Continue with PT and OT   Stop IV fluids   Continue with other home medications  Case management is working on placement  Patient has been accepted but we can not discharge her since her white cell count is trending up and sodium is still 125    VTE prophylaxis:  Lovenox    Patient condition:  Stable/Fair/Guarded/ Serious/ Critical    Anticipated discharge and Disposition:         All diagnosis and differential diagnosis have been reviewed; assessment and plan has been documented; I have personally reviewed the labs and test results that are presently available; I have reviewed the patients medication list; I have reviewed the consulting providers response and recommendations. I have reviewed or attempted to review medical records based upon their availability    All of the patient's questions have been  addressed and answered. Patient's is agreeable to the above stated plan. I will continue to monitor closely and make adjustments to medical  management as needed.  _____________________________________________________________________    Nutrition Status:    Radiology:  I have personally reviewed the following imaging and agree with the radiologist.     CT Thoracic Spine Without Contrast  Narrative: EXAMINATION:  CT THORACIC SPINE WITHOUT CONTRAST    CLINICAL HISTORY:  Compression fracture, thoracic;    TECHNIQUE:  Multiple axial images were obtained of the thoracic spine and sagittal and coronal reconstructions were performed.  Automatic exposure control (AEC) is utilized to reduce patient radiation exposure.    COMPARISON:  CT scan of the chest abdomen pelvis from 02/13/2024.    FINDINGS:  The bones are diffusely osteoporotic.  There is a compression fracture seen at the level of T9.  It is new since the prior examination.  There is evidence of vertebral plana.  There is mild posterior buckling of the mid fracture fragment.  No obvious spinal stenosis is seen on this examination.  No other fractures are seen.    There is a large mass in the left upper lobe of the lung.  It is larger than the prior examination.  There is a pleural base lesion seen in the left upper hemithorax which appears slightly more prominent..  Visualized portion of the ribs show no acute fracture.  There are bilateral pleural effusions and bibasilar atelectasis.  This is worse than the prior examination.  There is some supraclavicular lymphadenopathy seen on the right side.  The chest is incompletely evaluated on this examination.  Impression: T9 compression fracture consistent with vertebral plana with some posterior buckling of the mid fracture fragment.    Large mass in the left lung apex with a pleural based area of nodularity seen in the left upper hemithorax as well.  Both of these lesions are larger than the prior examination    Bilateral pleural effusions slightly worse than the prior examination some mild supraclavicular lymphadenopathy on the right side.  The chest  findings are incompletely evaluated on this CT scan of the thoracic spine..    This report was flagged in Epic as abnormal.    Electronically signed by: Lucien Parker  Date:    05/07/2024  Time:    18:19  CT Lumbar Spine Without Contrast  Narrative: EXAMINATION:  CT LUMBAR SPINE WITHOUT CONTRAST    CLINICAL HISTORY:  Low back pain, trauma;    TECHNIQUE:  Low-dose axial images with sagittal and coronal reformations are obtained through the lumbar spine.  Contrast was not administered.  Automatic exposure control (AEC) is utilized to reduce patient radiation exposure.    COMPARISON:  CT scan of the chest abdomen pelvis from 02/13/2024    FINDINGS:  The bones are diffusely osteoporotic.  The vertebral body heights are well maintained in the lumbar spine.  There is a hemangioma at the level of L4.  This was seen on the prior examination as well.  There is anterolisthesis of L4 on L5 that measures 6 mm.  This is stable since 02/13/2024.  There is multilevel facet arthropathy seen.  No lumbar spine fracture is seen.  The upper sacrum appears to be intact.  There is mild levoscoliosis of the lumbar spine with apex at L2-L3.  There is some evidence of foraminal stenosis at multiple levels in the lower lumbar spine.    No soft tissue abnormality seen.    Lower thoracic region shows some bilateral pleural effusions and right lower lobe atelectasis which are incompletely evaluated on this examination.  Impression: Chronic changes seen in the lumbar spine as outlined above    Areas of small effusions and atelectasis in the lung bases which is not well evaluated on this examination.    Electronically signed by: Lucien Parker  Date:    05/07/2024  Time:    18:05      Kassandra Corona MD  Department of Hospital Medicine   [facility]   05/10/2024

## 2024-05-10 NOTE — PT/OT/SLP PROGRESS
Physical Therapy Treatment    Patient Name:  Jacques Richmond   MRN:  [id number]    Recommendations:     Discharge therapy intensity: Moderate Intensity Therapy   Discharge Equipment Recommendations: to be determined by next level of care  Barriers to discharge: Impaired mobility and impaired cognition     Assessment:     Jacques Richmond is a 89 y.o. female admitted with a medical diagnosis of fall. Found to have a T9 compression fracture. She was discharged home with a brace. She presented back to ED on 5/7 with uncontrolled pain. .  She presents with the following impairments/functional limitations: weakness, impaired endurance, impaired self care skills, impaired functional mobility, gait instability, impaired balance, decreased safety awareness, pain.    Pt required max/repetitive vcs for all activities to ensure safety. Remains a high fall risk.     Rehab Prognosis: Good; patient would benefit from acute skilled PT services to address these deficits and reach maximum level of function.    Recent Surgery: * No surgery found *      Plan:     During this hospitalization, patient would benefit from acute PT services 5 x/week to address the identified rehab impairments via gait training, therapeutic activities, therapeutic exercises, neuromuscular re-education and progress toward the following goals:    Plan of Care Expires:  06/08/24    Subjective     Chief Complaint:   Patient/Family Comments/goals:   Pain/Comfort:  Pain Rating 1: 0/10      Objective:     Communicated with NSG prior to session.  Patient found HOB elevated with telemetry, pulse ox (continuous), peripheral IV, Other (comments), bed alarm (roll belt), bed alarm, roll belt upon PT entry to room.     General Precautions: Standard, fall  Orthopedic Precautions: spinal precautions  Braces: TLSO  Respiratory Status: Room air  Blood Pressure:   Skin Integrity: Visible skin intact      Functional Mobility:  Bed Mobility:     Scooting: contact guard  assistance  Supine to Sit: contact guard assistance  Transfers:     Sit to Stand:  minimum assistance and moderate assistance with rolling walker and 1 trial from EOB, 2 trials from bedside chair. Pt varied min-modA  Gait: pt amb 30ft/42ft with RW Kristyn. Pt with unsteady gait and required steadying assistance as well as assist with walker management. Pt with step-to gait pattern. Long Seated rest required between trials.        Patient left up in chair with all lines intact, call button in reach, and chair alarm on, posey vest    GOALS:   Multidisciplinary Problems       Physical Therapy Goals          Problem: Physical Therapy    Goal Priority Disciplines Outcome Goal Variances Interventions   Physical Therapy Goal     PT, PT/OT Progressing     Description: Goals to be met by: 24     Patient will increase functional independence with mobility by performin. Supine to sit with Reeves  2. Sit to supine with Reeves  3. Sit to stand transfer with Modified Reeves  4. Gait  x 300 feet with Modified Reeves using Rolling Walker.                          Time Tracking:     PT Received On: 05/10/24  PT Start Time: 946     PT Stop Time: 1009  PT Total Time (min): 23 min     Billable Minutes: Gait Training 23    Treatment Type: Treatment  PT/PTA: PTA     Number of PTA visits since last PT visit: 2     05/10/2024

## 2024-05-11 LAB
ANION GAP SERPL CALC-SCNC: 8 MEQ/L
BASOPHILS # BLD AUTO: 0.04 X10(3)/MCL
BASOPHILS NFR BLD AUTO: 0.3 %
BUN SERPL-MCNC: 18.2 MG/DL (ref 9.8–20.1)
CALCIUM SERPL-MCNC: 8.7 MG/DL (ref 8.4–10.2)
CHLORIDE SERPL-SCNC: 94 MMOL/L (ref 98–111)
CO2 SERPL-SCNC: 26 MMOL/L (ref 23–31)
CREAT SERPL-MCNC: 0.57 MG/DL (ref 0.55–1.02)
CREAT/UREA NIT SERPL: 32
EOSINOPHIL # BLD AUTO: 0.02 X10(3)/MCL (ref 0–0.9)
EOSINOPHIL NFR BLD AUTO: 0.1 %
ERYTHROCYTE [DISTWIDTH] IN BLOOD BY AUTOMATED COUNT: 16.9 % (ref 11.5–17)
GFR SERPLBLD CREATININE-BSD FMLA CKD-EPI: >60 ML/MIN/1.73/M2
GLUCOSE SERPL-MCNC: 146 MG/DL (ref 75–121)
HCT VFR BLD AUTO: 28.2 % (ref 37–47)
HGB BLD-MCNC: 9.2 G/DL (ref 12–16)
IMM GRANULOCYTES # BLD AUTO: 0.07 X10(3)/MCL (ref 0–0.04)
IMM GRANULOCYTES NFR BLD AUTO: 0.5 %
LYMPHOCYTES # BLD AUTO: 1.41 X10(3)/MCL (ref 0.6–4.6)
LYMPHOCYTES NFR BLD AUTO: 9.5 %
MCH RBC QN AUTO: 24.5 PG (ref 27–31)
MCHC RBC AUTO-ENTMCNC: 32.6 G/DL (ref 33–36)
MCV RBC AUTO: 75.2 FL (ref 80–94)
MONOCYTES # BLD AUTO: 0.64 X10(3)/MCL (ref 0.1–1.3)
MONOCYTES NFR BLD AUTO: 4.3 %
NEUTROPHILS # BLD AUTO: 12.66 X10(3)/MCL (ref 2.1–9.2)
NEUTROPHILS NFR BLD AUTO: 85.3 %
NRBC BLD AUTO-RTO: 0 %
PLATELET # BLD AUTO: 753 X10(3)/MCL (ref 130–400)
PMV BLD AUTO: 8.5 FL (ref 7.4–10.4)
POCT GLUCOSE: 104 MG/DL (ref 70–110)
POCT GLUCOSE: 111 MG/DL (ref 70–110)
POCT GLUCOSE: 116 MG/DL (ref 70–110)
POCT GLUCOSE: 122 MG/DL (ref 70–110)
POTASSIUM SERPL-SCNC: 3.9 MMOL/L (ref 3.5–5.1)
RBC # BLD AUTO: 3.75 X10(6)/MCL (ref 4.2–5.4)
SODIUM SERPL-SCNC: 128 MMOL/L (ref 136–145)
WBC # SPEC AUTO: 14.84 X10(3)/MCL (ref 4.5–11.5)

## 2024-05-11 PROCEDURE — 25000003 PHARM REV CODE 250: Performed by: INTERNAL MEDICINE

## 2024-05-11 PROCEDURE — 36415 COLL VENOUS BLD VENIPUNCTURE: CPT | Performed by: INTERNAL MEDICINE

## 2024-05-11 PROCEDURE — 63600175 PHARM REV CODE 636 W HCPCS: Performed by: NURSE PRACTITIONER

## 2024-05-11 PROCEDURE — 11000001 HC ACUTE MED/SURG PRIVATE ROOM

## 2024-05-11 PROCEDURE — 25000003 PHARM REV CODE 250: Performed by: NURSE PRACTITIONER

## 2024-05-11 PROCEDURE — 80048 BASIC METABOLIC PNL TOTAL CA: CPT | Performed by: INTERNAL MEDICINE

## 2024-05-11 PROCEDURE — 63600175 PHARM REV CODE 636 W HCPCS: Performed by: INTERNAL MEDICINE

## 2024-05-11 PROCEDURE — 85025 COMPLETE CBC W/AUTO DIFF WBC: CPT | Performed by: INTERNAL MEDICINE

## 2024-05-11 RX ORDER — SODIUM CHLORIDE 1 G/1
1000 TABLET ORAL 3 TIMES DAILY
Status: DISCONTINUED | OUTPATIENT
Start: 2024-05-11 | End: 2024-05-13 | Stop reason: HOSPADM

## 2024-05-11 RX ADMIN — ACETAMINOPHEN 325MG 650 MG: 325 TABLET ORAL at 04:05

## 2024-05-11 RX ADMIN — ACETAMINOPHEN 325MG 650 MG: 325 TABLET ORAL at 10:05

## 2024-05-11 RX ADMIN — METOPROLOL SUCCINATE 50 MG: 50 TABLET, EXTENDED RELEASE ORAL at 09:05

## 2024-05-11 RX ADMIN — ENOXAPARIN SODIUM 40 MG: 40 INJECTION SUBCUTANEOUS at 04:05

## 2024-05-11 RX ADMIN — SODIUM CHLORIDE 1000 MG: 1 TABLET ORAL at 08:05

## 2024-05-11 RX ADMIN — PANTOPRAZOLE SODIUM 40 MG: 40 TABLET, DELAYED RELEASE ORAL at 09:05

## 2024-05-11 RX ADMIN — BUSPIRONE HYDROCHLORIDE 7.5 MG: 7.5 TABLET ORAL at 02:05

## 2024-05-11 RX ADMIN — LISINOPRIL 30 MG: 20 TABLET ORAL at 09:05

## 2024-05-11 RX ADMIN — LEVOTHYROXINE SODIUM 125 MCG: 125 TABLET ORAL at 04:05

## 2024-05-11 RX ADMIN — LEVOFLOXACIN 500 MG: 500 INJECTION, SOLUTION INTRAVENOUS at 11:05

## 2024-05-11 RX ADMIN — BUSPIRONE HYDROCHLORIDE 7.5 MG: 7.5 TABLET ORAL at 08:05

## 2024-05-11 RX ADMIN — HYDRALAZINE HYDROCHLORIDE 10 MG: 20 INJECTION INTRAMUSCULAR; INTRAVENOUS at 05:05

## 2024-05-11 RX ADMIN — SODIUM CHLORIDE 1000 MG: 1 TABLET ORAL at 09:05

## 2024-05-11 RX ADMIN — ACETAMINOPHEN 325MG 650 MG: 325 TABLET ORAL at 03:05

## 2024-05-11 RX ADMIN — SODIUM CHLORIDE 1000 MG: 1 TABLET ORAL at 02:05

## 2024-05-11 RX ADMIN — ATORVASTATIN CALCIUM 20 MG: 10 TABLET, FILM COATED ORAL at 09:05

## 2024-05-11 RX ADMIN — BUSPIRONE HYDROCHLORIDE 7.5 MG: 7.5 TABLET ORAL at 09:05

## 2024-05-11 NOTE — PLAN OF CARE
Problem: Adult Inpatient Plan of Care  Goal: Plan of Care Review  5/11/2024 0124 by Yessica Pereyra RN  Outcome: Progressing  5/11/2024 0124 by Yessica Pereyra RN  Outcome: Progressing  Goal: Patient-Specific Goal (Individualized)  5/11/2024 0124 by Yessica Pereyra RN  Outcome: Progressing  5/11/2024 0124 by Yessica Pereyra RN  Outcome: Progressing  Goal: Absence of Hospital-Acquired Illness or Injury  5/11/2024 0124 by Yessica Pereyra RN  Outcome: Progressing  5/11/2024 0124 by Yessica Pereyra RN  Outcome: Progressing  Goal: Optimal Comfort and Wellbeing  5/11/2024 0124 by Yessica Pereyra RN  Outcome: Progressing  5/11/2024 0124 by Yessica Pereyra RN  Outcome: Progressing  Goal: Readiness for Transition of Care  5/11/2024 0124 by Yessica Pereyra RN  Outcome: Progressing  5/11/2024 0124 by Yessica Pereyra RN  Outcome: Progressing     Problem: Wound  Goal: Optimal Coping  5/11/2024 0124 by Yessica Pereyra RN  Outcome: Progressing  5/11/2024 0124 by Yessica Pereyra RN  Outcome: Progressing  Goal: Optimal Functional Ability  5/11/2024 0124 by Yessica Pereyra RN  Outcome: Progressing  5/11/2024 0124 by Yessica Pereyra RN  Outcome: Progressing  Goal: Absence of Infection Signs and Symptoms  5/11/2024 0124 by Yessica Pereyra RN  Outcome: Progressing  5/11/2024 0124 by Yessica Pereyra RN  Outcome: Progressing  Goal: Improved Oral Intake  5/11/2024 0124 by Yessica Pereyra RN  Outcome: Progressing  5/11/2024 0124 by Yessica Pereyra RN  Outcome: Progressing  Goal: Optimal Pain Control and Function  5/11/2024 0124 by Yessica Pereyra RN  Outcome: Progressing  5/11/2024 0124 by Yessica Pereyra RN  Outcome: Progressing  Goal: Skin Health and Integrity  5/11/2024 0124 by Yessica Pereyra RN  Outcome: Progressing  5/11/2024 0124 by Yessica Pereyra RN  Outcome: Progressing  Goal: Optimal Wound Healing  5/11/2024 0124 by Yessica Pereyra, RN  Outcome: Progressing  5/11/2024 0124 by Yessica Pereyra, RN  Outcome:  Progressing     Problem: Skin Injury Risk Increased  Goal: Skin Health and Integrity  5/11/2024 0124 by Yessica Pereyra RN  Outcome: Progressing  5/11/2024 0124 by Yessica Pereyra RN  Outcome: Progressing     Problem: Fall Injury Risk  Goal: Absence of Fall and Fall-Related Injury  5/11/2024 0124 by Yessica Pereyra RN  Outcome: Progressing  5/11/2024 0124 by Yessica Pereyra RN  Outcome: Progressing     Problem: Pain Acute  Goal: Optimal Pain Control and Function  Outcome: Progressing

## 2024-05-11 NOTE — PLAN OF CARE
Problem: Adult Inpatient Plan of Care  Goal: Plan of Care Review  Outcome: Progressing  Goal: Patient-Specific Goal (Individualized)  Outcome: Progressing  Goal: Absence of Hospital-Acquired Illness or Injury  Outcome: Progressing  Goal: Optimal Comfort and Wellbeing  Outcome: Progressing  Goal: Readiness for Transition of Care  Outcome: Progressing     Problem: Wound  Goal: Optimal Coping  Outcome: Progressing  Goal: Optimal Functional Ability  Outcome: Progressing  Goal: Absence of Infection Signs and Symptoms  Outcome: Progressing  Goal: Improved Oral Intake  Outcome: Progressing  Goal: Optimal Pain Control and Function  Outcome: Progressing  Goal: Skin Health and Integrity  Outcome: Progressing  Goal: Optimal Wound Healing  Outcome: Progressing     Problem: Skin Injury Risk Increased  Goal: Skin Health and Integrity  Outcome: Progressing     Problem: Fall Injury Risk  Goal: Absence of Fall and Fall-Related Injury  Outcome: Progressing     Problem: Pain Acute  Goal: Optimal Pain Control and Function  Outcome: Progressing

## 2024-05-11 NOTE — PROGRESS NOTES
[facility] Hospital Medicine Progress Note        Chief Complaint: Inpatient Follow-up for     HPI: 89 year old female with a pmh of HTN, HLD, DM2, Hypothyroidism, GERD, osteoarthritis, and anxiety who presented to the ED with c/o back pain, not relieved by prescription pain meds.  She had a fall on 5/2 and was seen in this ED and dx with T9 compression fracture. Neurosurgery was consulted, she refused further imaging and wanted to go home. She was fitted for a TLSO brace at that time and told to f/u with neurosurgery and was prescribed Norco 5 1/2 tab every 6 hours. She states that she has continued to have unrelenting pain, so she decided to come back in.     ED vitals on arrival:  Temp 97.7° F, pulse 90, resp 18, /89, SpO2 97% on RA. No lab work performed while in the ED. CT thoracic spine without contrast showed T9 compression fracture consistent with vertebral plana with some posterior buckling of the mid fracture fragment.  Large mass in the left lung apex with a pleural-based area of nodularity seen in the left upper hemothorax as well.  Both of these lesions are larger than the prior examination.  Bilateral pleural effusion slightly worse than the prior examination.  Some mild supraclavicular lymphadenopathy on the right side.  CT lumbar spine without contrast showed chronic changes seen in the lumbar spine.  Neurosurgery was consulted in the ED. She was admitted to Hospital Medicine for management.    Interval Hx:   Patient seen and examined this morning family member bedside reports had a slightly better night pain is controlled     Objective/physical exam:  General: In no acute distress, afebrile  Chest: Clear to auscultation bilaterally  Heart: RRR, +S1, S2, no appreciable murmur  Abdomen: Soft, nontender, BS +  MSK: Warm, no lower extremity edema, no clubbing or cyanosis  Neurologic: Alert and oriented x4, s    VITAL SIGNS: 24 HRS MIN & MAX LAST   Temp  Min: 97.5 °F  (36.4 °C)  Max: 98.2 °F (36.8 °C) 98.1 °F (36.7 °C)   BP  Min: 121/71  Max: 193/90 (!) 148/81   Pulse  Min: 79  Max: 106  83   Resp  Min: 18  Max: 18 18   SpO2  Min: 92 %  Max: 98 % 98 %     I have reviewed the following labs:  Recent Labs   Lab 05/09/24  0443 05/10/24  0837 05/11/24  0830   WBC 13.49* 15.36* 14.84*   RBC 3.55* 4.29 3.75*   HGB 8.6* 10.2* 9.2*   HCT 26.9* 32.3* 28.2*   MCV 75.8* 75.3* 75.2*   MCH 24.2* 23.8* 24.5*   MCHC 32.0* 31.6* 32.6*   RDW 16.7 17.0 16.9   * 777* 753*   MPV 8.4 8.3 8.5     Recent Labs   Lab 05/07/24 2127 05/08/24 0334 05/09/24  0443 05/10/24  0837 05/11/24  0830   * 124* 125* 125* 128*   K 4.4 4.4 4.4 4.0 3.9   CO2 25 24 26 27 26   BUN 20.2* 16.4 16.9 12.5 18.2   CREATININE 0.51* 0.52* 0.62 0.57 0.57   CALCIUM 8.4 8.4 8.4 8.8 8.7   MG 1.40* 1.90 1.80  --   --    ALBUMIN 2.5* 2.3* 2.4*  --   --    ALKPHOS 58 54 60  --   --    ALT 36 32 32  --   --    AST 30 24 27  --   --    BILITOT 0.3 0.4 0.4  --   --      Microbiology Results (last 7 days)       Procedure Component Value Units Date/Time    Blood Culture [[id number]]  (Normal) Collected: 05/10/24 1042    Order Status: Completed Specimen: Blood from Hand, Left Updated: 05/11/24 1101     Blood Culture No Growth At 24 Hours    Blood Culture [[id number]]  (Normal) Collected: 05/10/24 1042    Order Status: Completed Specimen: Blood from Arm, Left Updated: 05/11/24 1101     Blood Culture No Growth At 24 Hours             See below for Radiology    Scheduled Med:   atorvastatin  20 mg Oral Daily    busPIRone  7.5 mg Oral TID    enoxparin  40 mg Subcutaneous Daily    levoFLOXacin  500 mg Intravenous Q24H    levothyroxine  125 mcg Oral Before breakfast    lisinopriL  30 mg Oral Daily    metoprolol succinate  50 mg Oral Daily    pantoprazole  40 mg Oral Daily    sodium chloride  1,000 mg Oral BID      Continuous Infusions:       PRN Meds:    Current Facility-Administered Medications:     acetaminophen, 650 mg, Oral, Q6H  PRN    ALPRAZolam, 0.25 mg, Oral, BID PRN    aluminum-magnesium hydroxide-simethicone, 30 mL, Oral, QID PRN    dextrose 10%, 12.5 g, Intravenous, PRN    dextrose 10%, 25 g, Intravenous, PRN    glucagon (human recombinant), 1 mg, Intramuscular, PRN    glucose, 16 g, Oral, PRN    glucose, 24 g, Oral, PRN    hydrALAZINE, 10 mg, Intravenous, Q4H PRN    insulin aspart U-100, 0-10 Units, Subcutaneous, QID (AC + HS) PRN    melatonin, 6 mg, Oral, Nightly PRN    morphine, 4 mg, Intravenous, Q4H PRN    naloxone, 0.02 mg, Intravenous, PRN    ondansetron, 4 mg, Intravenous, Q4H PRN    oxyCODONE-acetaminophen, 1 tablet, Oral, Q6H PRN    polyethylene glycol, 17 g, Oral, BID PRN    prochlorperazine, 5 mg, Intravenous, Q6H PRN    simethicone, 1 tablet, Oral, QID PRN     Assessment/Plan:  T9 compression fracture   Hyponatremia   Leukocytosis  Left upper lobe lung mass   History of SIADH   Chronic microcytic anemia  Hypertension  Hyperlipidemia  Diabetes mellitus type 2   Hypothyroidism  GERD  Osteoarthritis   Anxiety    Blood cultures pending chest x-ray as such was negative but did show a mass so they might be concern about postobstructive pneumonia so I will continue with Levaquin for now  Sodium is improving increase the salt tablets to 1 g t.i.d. patient was taking salt tablets at home UA was negative for any kind of bacteriuria  Continue with TSLo brace, neurosurgery consulted they recommended outpatient follow up in 4-6 weeks   Continue with pain control   Continue with PT and OT   Stop IV fluids   Continue with other home medications  Case management is working on placement  Patient has been accepted for Monday  VTE prophylaxis:  Lovenox    Patient condition:  Stable/Fair/Guarded/ Serious/ Critical    Anticipated discharge and Disposition:         All diagnosis and differential diagnosis have been reviewed; assessment and plan has been documented; I have personally reviewed the labs and test results that are presently  available; I have reviewed the patients medication list; I have reviewed the consulting providers response and recommendations. I have reviewed or attempted to review medical records based upon their availability    All of the patient's questions have been  addressed and answered. Patient's is agreeable to the above stated plan. I will continue to monitor closely and make adjustments to medical management as needed.  _____________________________________________________________________    Nutrition Status:    Radiology:  I have personally reviewed the following imaging and agree with the radiologist.     X-Ray Chest 1 View  Narrative: EXAMINATION:  XR CHEST 1 VIEW    CPT 98973    CLINICAL HISTORY:  LEUCOCYTOSIS;    COMPARISON:  May 2, 2024    FINDINGS:  Examination reveals mediastinal silhouette to be within normal limits cardiac silhouette is not enlarged persistent mass in the left upper lobe similar to what was seen in the previous examination of May 2, 2024 with no new focal consolidative changes atelectases effusions or pneumothoraces.    There appears to be interposition of gas between the right hemidiaphragm in the liver.    No other significant abnormality  Impression: Persistent mass in the left upper lobe.    Otherwise no active pulmonary disease.    Interposition of gas between the right hemidiaphragm and the liver.    No other significant change    Electronically signed by: Kvng Mccoy  Date:    05/10/2024  Time:    11:34      Kassandra Corona MD  Department of Hospital Medicine   [facility]   05/11/2024

## 2024-05-12 LAB
ANION GAP SERPL CALC-SCNC: 8 MEQ/L
BASOPHILS # BLD AUTO: 0.04 X10(3)/MCL
BASOPHILS NFR BLD AUTO: 0.3 %
BUN SERPL-MCNC: 18.9 MG/DL (ref 9.8–20.1)
CALCIUM SERPL-MCNC: 8.4 MG/DL (ref 8.4–10.2)
CHLORIDE SERPL-SCNC: 95 MMOL/L (ref 98–111)
CO2 SERPL-SCNC: 24 MMOL/L (ref 23–31)
CREAT SERPL-MCNC: 0.57 MG/DL (ref 0.55–1.02)
CREAT/UREA NIT SERPL: 33
EOSINOPHIL # BLD AUTO: 0.04 X10(3)/MCL (ref 0–0.9)
EOSINOPHIL NFR BLD AUTO: 0.3 %
ERYTHROCYTE [DISTWIDTH] IN BLOOD BY AUTOMATED COUNT: 17.2 % (ref 11.5–17)
GFR SERPLBLD CREATININE-BSD FMLA CKD-EPI: >60 ML/MIN/1.73/M2
GLUCOSE SERPL-MCNC: 146 MG/DL (ref 75–121)
HCT VFR BLD AUTO: 27.4 % (ref 37–47)
HGB BLD-MCNC: 8.8 G/DL (ref 12–16)
IMM GRANULOCYTES # BLD AUTO: 0.09 X10(3)/MCL (ref 0–0.04)
IMM GRANULOCYTES NFR BLD AUTO: 0.6 %
LYMPHOCYTES # BLD AUTO: 1.51 X10(3)/MCL (ref 0.6–4.6)
LYMPHOCYTES NFR BLD AUTO: 9.5 %
MCH RBC QN AUTO: 24 PG (ref 27–31)
MCHC RBC AUTO-ENTMCNC: 32.1 G/DL (ref 33–36)
MCV RBC AUTO: 74.7 FL (ref 80–94)
MONOCYTES # BLD AUTO: 0.85 X10(3)/MCL (ref 0.1–1.3)
MONOCYTES NFR BLD AUTO: 5.4 %
NEUTROPHILS # BLD AUTO: 13.32 X10(3)/MCL (ref 2.1–9.2)
NEUTROPHILS NFR BLD AUTO: 83.9 %
NRBC BLD AUTO-RTO: 0 %
OSMOLALITY SERPL: 271 MOSM/KG (ref 280–300)
OSMOLALITY UR: 622 MOSM/KG (ref 300–1300)
PLATELET # BLD AUTO: 716 X10(3)/MCL (ref 130–400)
PMV BLD AUTO: 8.3 FL (ref 7.4–10.4)
POCT GLUCOSE: 110 MG/DL (ref 70–110)
POCT GLUCOSE: 114 MG/DL (ref 70–110)
POTASSIUM SERPL-SCNC: 3.6 MMOL/L (ref 3.5–5.1)
RBC # BLD AUTO: 3.67 X10(6)/MCL (ref 4.2–5.4)
SODIUM SERPL-SCNC: 127 MMOL/L (ref 136–145)
SODIUM UR-SCNC: 28 MMOL/L
WBC # SPEC AUTO: 15.85 X10(3)/MCL (ref 4.5–11.5)

## 2024-05-12 PROCEDURE — 25000003 PHARM REV CODE 250: Performed by: NURSE PRACTITIONER

## 2024-05-12 PROCEDURE — 63600175 PHARM REV CODE 636 W HCPCS: Performed by: INTERNAL MEDICINE

## 2024-05-12 PROCEDURE — 80048 BASIC METABOLIC PNL TOTAL CA: CPT | Performed by: INTERNAL MEDICINE

## 2024-05-12 PROCEDURE — 63600175 PHARM REV CODE 636 W HCPCS: Performed by: NURSE PRACTITIONER

## 2024-05-12 PROCEDURE — 11000001 HC ACUTE MED/SURG PRIVATE ROOM

## 2024-05-12 PROCEDURE — 85025 COMPLETE CBC W/AUTO DIFF WBC: CPT | Performed by: INTERNAL MEDICINE

## 2024-05-12 PROCEDURE — 84300 ASSAY OF URINE SODIUM: CPT | Performed by: INTERNAL MEDICINE

## 2024-05-12 PROCEDURE — 83930 ASSAY OF BLOOD OSMOLALITY: CPT | Performed by: INTERNAL MEDICINE

## 2024-05-12 PROCEDURE — 83935 ASSAY OF URINE OSMOLALITY: CPT | Performed by: INTERNAL MEDICINE

## 2024-05-12 PROCEDURE — 36415 COLL VENOUS BLD VENIPUNCTURE: CPT | Performed by: INTERNAL MEDICINE

## 2024-05-12 PROCEDURE — 25000003 PHARM REV CODE 250: Performed by: INTERNAL MEDICINE

## 2024-05-12 RX ORDER — AMLODIPINE BESYLATE 5 MG/1
5 TABLET ORAL DAILY
Status: DISCONTINUED | OUTPATIENT
Start: 2024-05-12 | End: 2024-05-13 | Stop reason: HOSPADM

## 2024-05-12 RX ADMIN — LEVOFLOXACIN 500 MG: 500 INJECTION, SOLUTION INTRAVENOUS at 11:05

## 2024-05-12 RX ADMIN — OXYCODONE HYDROCHLORIDE AND ACETAMINOPHEN 1 TABLET: 5; 325 TABLET ORAL at 08:05

## 2024-05-12 RX ADMIN — LEVOTHYROXINE SODIUM 125 MCG: 125 TABLET ORAL at 06:05

## 2024-05-12 RX ADMIN — METOPROLOL SUCCINATE 50 MG: 50 TABLET, EXTENDED RELEASE ORAL at 08:05

## 2024-05-12 RX ADMIN — ACETAMINOPHEN 325MG 650 MG: 325 TABLET ORAL at 02:05

## 2024-05-12 RX ADMIN — ATORVASTATIN CALCIUM 20 MG: 10 TABLET, FILM COATED ORAL at 08:05

## 2024-05-12 RX ADMIN — LISINOPRIL 30 MG: 20 TABLET ORAL at 08:05

## 2024-05-12 RX ADMIN — SODIUM CHLORIDE 1000 MG: 1 TABLET ORAL at 08:05

## 2024-05-12 RX ADMIN — SODIUM CHLORIDE 1000 MG: 1 TABLET ORAL at 02:05

## 2024-05-12 RX ADMIN — ENOXAPARIN SODIUM 40 MG: 40 INJECTION SUBCUTANEOUS at 04:05

## 2024-05-12 RX ADMIN — AMLODIPINE BESYLATE 5 MG: 5 TABLET ORAL at 02:05

## 2024-05-12 RX ADMIN — SIMETHICONE 80 MG: 80 TABLET, CHEWABLE ORAL at 11:05

## 2024-05-12 RX ADMIN — BUSPIRONE HYDROCHLORIDE 7.5 MG: 7.5 TABLET ORAL at 08:05

## 2024-05-12 RX ADMIN — SODIUM CHLORIDE 1000 MG: 1 TABLET ORAL at 09:05

## 2024-05-12 RX ADMIN — PANTOPRAZOLE SODIUM 40 MG: 40 TABLET, DELAYED RELEASE ORAL at 08:05

## 2024-05-12 RX ADMIN — BUSPIRONE HYDROCHLORIDE 7.5 MG: 7.5 TABLET ORAL at 02:05

## 2024-05-12 RX ADMIN — OXYCODONE HYDROCHLORIDE AND ACETAMINOPHEN 1 TABLET: 5; 325 TABLET ORAL at 02:05

## 2024-05-12 NOTE — PROGRESS NOTES
[facility] Hospital Medicine Progress Note        Chief Complaint: Inpatient Follow-up for     HPI: 89 year old female with a pmh of HTN, HLD, DM2, Hypothyroidism, GERD, osteoarthritis, and anxiety who presented to the ED with c/o back pain, not relieved by prescription pain meds.  She had a fall on 5/2 and was seen in this ED and dx with T9 compression fracture. Neurosurgery was consulted, she refused further imaging and wanted to go home. She was fitted for a TLSO brace at that time and told to f/u with neurosurgery and was prescribed Norco 5 1/2 tab every 6 hours. She states that she has continued to have unrelenting pain, so she decided to come back in.     ED vitals on arrival:  Temp 97.7° F, pulse 90, resp 18, /89, SpO2 97% on RA. No lab work performed while in the ED. CT thoracic spine without contrast showed T9 compression fracture consistent with vertebral plana with some posterior buckling of the mid fracture fragment.  Large mass in the left lung apex with a pleural-based area of nodularity seen in the left upper hemothorax as well.  Both of these lesions are larger than the prior examination.  Bilateral pleural effusion slightly worse than the prior examination.  Some mild supraclavicular lymphadenopathy on the right side.  CT lumbar spine without contrast showed chronic changes seen in the lumbar spine.  Neurosurgery was consulted in the ED. She was admitted to Hospital Medicine for management.    Interval Hx:   Patient seen and examined this morning family member bedside apparently pain was given Percocet she has been on and off confused family's requesting discontinuing Percocet   Objective/physical exam:  General: In no acute distress, afebrile  Chest: Clear to auscultation bilaterally  Heart: RRR, +S1, S2, no appreciable murmur  Abdomen: Soft, nontender, BS +  MSK: Warm, no lower extremity edema, no clubbing or cyanosis  Neurologic: Alert and oriented x4,  s    VITAL SIGNS: 24 HRS MIN & MAX LAST   Temp  Min: 97.6 °F (36.4 °C)  Max: 98.1 °F (36.7 °C) 97.6 °F (36.4 °C)   BP  Min: 127/75  Max: 179/88 (!) 144/81   Pulse  Min: 73  Max: 107  107   Resp  Min: 18  Max: 18 18   SpO2  Min: 94 %  Max: 98 % (!) 94 %     I have reviewed the following labs:  Recent Labs   Lab 05/10/24  0837 05/11/24  0830 05/12/24 0424   WBC 15.36* 14.84* 15.85*   RBC 4.29 3.75* 3.67*   HGB 10.2* 9.2* 8.8*   HCT 32.3* 28.2* 27.4*   MCV 75.3* 75.2* 74.7*   MCH 23.8* 24.5* 24.0*   MCHC 31.6* 32.6* 32.1*   RDW 17.0 16.9 17.2*   * 753* 716*   MPV 8.3 8.5 8.3     Recent Labs   Lab 05/07/24 2127 05/08/24  0334 05/09/24  0443 05/10/24  0837 05/11/24  0830 05/12/24  0424   * 124* 125* 125* 128* 127*   K 4.4 4.4 4.4 4.0 3.9 3.6   CO2 25 24 26 27 26 24   BUN 20.2* 16.4 16.9 12.5 18.2 18.9   CREATININE 0.51* 0.52* 0.62 0.57 0.57 0.57   CALCIUM 8.4 8.4 8.4 8.8 8.7 8.4   MG 1.40* 1.90 1.80  --   --   --    ALBUMIN 2.5* 2.3* 2.4*  --   --   --    ALKPHOS 58 54 60  --   --   --    ALT 36 32 32  --   --   --    AST 30 24 27  --   --   --    BILITOT 0.3 0.4 0.4  --   --   --      Microbiology Results (last 7 days)       Procedure Component Value Units Date/Time    Blood Culture [[id number]]  (Normal) Collected: 05/10/24 1042    Order Status: Completed Specimen: Blood from Hand, Left Updated: 05/12/24 1101     Blood Culture No Growth At 48 Hours    Blood Culture [[id number]]  (Normal) Collected: 05/10/24 1042    Order Status: Completed Specimen: Blood from Arm, Left Updated: 05/12/24 1101     Blood Culture No Growth At 48 Hours             See below for Radiology    Scheduled Med:   atorvastatin  20 mg Oral Daily    busPIRone  7.5 mg Oral TID    enoxparin  40 mg Subcutaneous Daily    levoFLOXacin  500 mg Intravenous Q24H    levothyroxine  125 mcg Oral Before breakfast    lisinopriL  30 mg Oral Daily    metoprolol succinate  50 mg Oral Daily    pantoprazole  40 mg Oral Daily    sodium chloride  1,000  mg Oral TID      Continuous Infusions:       PRN Meds:    Current Facility-Administered Medications:     acetaminophen, 650 mg, Oral, Q6H PRN    ALPRAZolam, 0.25 mg, Oral, BID PRN    aluminum-magnesium hydroxide-simethicone, 30 mL, Oral, QID PRN    dextrose 10%, 12.5 g, Intravenous, PRN    dextrose 10%, 25 g, Intravenous, PRN    glucagon (human recombinant), 1 mg, Intramuscular, PRN    glucose, 16 g, Oral, PRN    glucose, 24 g, Oral, PRN    hydrALAZINE, 10 mg, Intravenous, Q4H PRN    insulin aspart U-100, 0-10 Units, Subcutaneous, QID (AC + HS) PRN    melatonin, 6 mg, Oral, Nightly PRN    morphine, 4 mg, Intravenous, Q4H PRN    naloxone, 0.02 mg, Intravenous, PRN    ondansetron, 4 mg, Intravenous, Q4H PRN    oxyCODONE-acetaminophen, 1 tablet, Oral, Q6H PRN    polyethylene glycol, 17 g, Oral, BID PRN    prochlorperazine, 5 mg, Intravenous, Q6H PRN    simethicone, 1 tablet, Oral, QID PRN     Assessment/Plan:  T9 compression fracture   Hyponatremia   Leukocytosis  Left upper lobe lung mass   History of SIADH   Chronic microcytic anemia  Hypertension  Hyperlipidemia  Diabetes mellitus type 2   Hypothyroidism  GERD  Osteoarthritis   Anxiety    We will discontinue Percocet continue with Xanax as needed at night  Blood cultures x2 negative chest x-ray as such did not show any infection UA was negative  Discussed with patient's daughter if they want any aggressive workup as most likely this can be coming from her cancer itself but for now I will continue with Levaquin and treated as pneumonia.  Family does not want any aggressive measures.  Did give them the option of CT of the chest  Continue with salt tablets 1 g t.i.d. will do fluid restriction  Continue with TSLo brace, neurosurgery consulted they recommended outpatient follow up in 4-6 weeks   Continue with pain control   Continue with PT and OT   Stop IV fluids   Continue with other home medications  Case management is working on placement  Patient has been accepted  for Monday      Patient and family they do not want any invasive workup for the lung mass it has a possibility that hyponatremia is because of that too  VTE prophylaxis:  Lovenox    Patient condition:  Stable/Fair/Guarded/ Serious/ Critical    Anticipated discharge and Disposition:         All diagnosis and differential diagnosis have been reviewed; assessment and plan has been documented; I have personally reviewed the labs and test results that are presently available; I have reviewed the patients medication list; I have reviewed the consulting providers response and recommendations. I have reviewed or attempted to review medical records based upon their availability    All of the patient's questions have been  addressed and answered. Patient's is agreeable to the above stated plan. I will continue to monitor closely and make adjustments to medical management as needed.  _____________________________________________________________________    Nutrition Status:    Radiology:  I have personally reviewed the following imaging and agree with the radiologist.     X-Ray Chest 1 View  Narrative: EXAMINATION:  XR CHEST 1 VIEW    CPT 26833    CLINICAL HISTORY:  LEUCOCYTOSIS;    COMPARISON:  May 2, 2024    FINDINGS:  Examination reveals mediastinal silhouette to be within normal limits cardiac silhouette is not enlarged persistent mass in the left upper lobe similar to what was seen in the previous examination of May 2, 2024 with no new focal consolidative changes atelectases effusions or pneumothoraces.    There appears to be interposition of gas between the right hemidiaphragm in the liver.    No other significant abnormality  Impression: Persistent mass in the left upper lobe.    Otherwise no active pulmonary disease.    Interposition of gas between the right hemidiaphragm and the liver.    No other significant change    Electronically signed by: Kvng Mccoy  Date:    05/10/2024  Time:    11:34      Kassandra  MD Cj  Department of Hospital Medicine   [facility]   05/12/2024

## 2024-05-13 VITALS
TEMPERATURE: 98 F | DIASTOLIC BLOOD PRESSURE: 57 MMHG | HEART RATE: 93 BPM | WEIGHT: 126 LBS | HEIGHT: 65 IN | OXYGEN SATURATION: 99 % | SYSTOLIC BLOOD PRESSURE: 99 MMHG | RESPIRATION RATE: 18 BRPM | BODY MASS INDEX: 20.99 KG/M2

## 2024-05-13 LAB
ANION GAP SERPL CALC-SCNC: 8 MEQ/L
BASOPHILS # BLD AUTO: 0.04 X10(3)/MCL
BASOPHILS NFR BLD AUTO: 0.3 %
BUN SERPL-MCNC: 18 MG/DL (ref 9.8–20.1)
CALCIUM SERPL-MCNC: 8 MG/DL (ref 8.4–10.2)
CHLORIDE SERPL-SCNC: 96 MMOL/L (ref 98–111)
CO2 SERPL-SCNC: 26 MMOL/L (ref 23–31)
CREAT SERPL-MCNC: 0.53 MG/DL (ref 0.55–1.02)
CREAT/UREA NIT SERPL: 34
EOSINOPHIL # BLD AUTO: 0.08 X10(3)/MCL (ref 0–0.9)
EOSINOPHIL NFR BLD AUTO: 0.6 %
ERYTHROCYTE [DISTWIDTH] IN BLOOD BY AUTOMATED COUNT: 17 % (ref 11.5–17)
GFR SERPLBLD CREATININE-BSD FMLA CKD-EPI: >60 ML/MIN/1.73/M2
GLUCOSE SERPL-MCNC: 106 MG/DL (ref 75–121)
HCT VFR BLD AUTO: 26.6 % (ref 37–47)
HGB BLD-MCNC: 8.4 G/DL (ref 12–16)
IMM GRANULOCYTES # BLD AUTO: 0.07 X10(3)/MCL (ref 0–0.04)
IMM GRANULOCYTES NFR BLD AUTO: 0.5 %
LYMPHOCYTES # BLD AUTO: 1.74 X10(3)/MCL (ref 0.6–4.6)
LYMPHOCYTES NFR BLD AUTO: 13 %
MCH RBC QN AUTO: 23.8 PG (ref 27–31)
MCHC RBC AUTO-ENTMCNC: 31.6 G/DL (ref 33–36)
MCV RBC AUTO: 75.4 FL (ref 80–94)
MONOCYTES # BLD AUTO: 0.92 X10(3)/MCL (ref 0.1–1.3)
MONOCYTES NFR BLD AUTO: 6.9 %
NEUTROPHILS # BLD AUTO: 10.53 X10(3)/MCL (ref 2.1–9.2)
NEUTROPHILS NFR BLD AUTO: 78.7 %
NRBC BLD AUTO-RTO: 0 %
PLATELET # BLD AUTO: 654 X10(3)/MCL (ref 130–400)
PMV BLD AUTO: 8.5 FL (ref 7.4–10.4)
POCT GLUCOSE: 187 MG/DL (ref 70–110)
POTASSIUM SERPL-SCNC: 4 MMOL/L (ref 3.5–5.1)
RBC # BLD AUTO: 3.53 X10(6)/MCL (ref 4.2–5.4)
SODIUM SERPL-SCNC: 130 MMOL/L (ref 136–145)
WBC # SPEC AUTO: 13.38 X10(3)/MCL (ref 4.5–11.5)

## 2024-05-13 PROCEDURE — 36415 COLL VENOUS BLD VENIPUNCTURE: CPT | Performed by: INTERNAL MEDICINE

## 2024-05-13 PROCEDURE — 25000003 PHARM REV CODE 250: Performed by: INTERNAL MEDICINE

## 2024-05-13 PROCEDURE — 25000003 PHARM REV CODE 250: Performed by: NURSE PRACTITIONER

## 2024-05-13 PROCEDURE — 85025 COMPLETE CBC W/AUTO DIFF WBC: CPT | Performed by: INTERNAL MEDICINE

## 2024-05-13 PROCEDURE — 63600175 PHARM REV CODE 636 W HCPCS: Performed by: INTERNAL MEDICINE

## 2024-05-13 PROCEDURE — 80048 BASIC METABOLIC PNL TOTAL CA: CPT | Performed by: INTERNAL MEDICINE

## 2024-05-13 PROCEDURE — 63600175 PHARM REV CODE 636 W HCPCS: Performed by: NURSE PRACTITIONER

## 2024-05-13 RX ORDER — ACETAMINOPHEN 500 MG
1000 TABLET ORAL ONCE
Status: COMPLETED | OUTPATIENT
Start: 2024-05-13 | End: 2024-05-13

## 2024-05-13 RX ORDER — LEVOFLOXACIN 500 MG/1
500 TABLET, FILM COATED ORAL DAILY
Start: 2024-05-13 | End: 2024-05-17

## 2024-05-13 RX ORDER — SODIUM CHLORIDE 1 G/1
1000 TABLET ORAL DAILY
Start: 2024-05-13 | End: 2024-05-20

## 2024-05-13 RX ORDER — ACETAMINOPHEN 325 MG/1
650 TABLET ORAL ONCE
Status: DISCONTINUED | OUTPATIENT
Start: 2024-05-13 | End: 2024-05-13

## 2024-05-13 RX ORDER — HYDROMORPHONE HYDROCHLORIDE 2 MG/ML
0.5 INJECTION, SOLUTION INTRAMUSCULAR; INTRAVENOUS; SUBCUTANEOUS
Status: DISCONTINUED | OUTPATIENT
Start: 2024-05-13 | End: 2024-05-13 | Stop reason: HOSPADM

## 2024-05-13 RX ORDER — METFORMIN HYDROCHLORIDE 500 MG/1
500 TABLET ORAL
Start: 2024-05-13

## 2024-05-13 RX ORDER — LIDOCAINE 50 MG/G
1 PATCH TOPICAL DAILY
Start: 2024-05-13

## 2024-05-13 RX ORDER — AMLODIPINE BESYLATE 10 MG/1
10 TABLET ORAL DAILY
Start: 2024-05-14 | End: 2024-06-13

## 2024-05-13 RX ADMIN — BUSPIRONE HYDROCHLORIDE 7.5 MG: 7.5 TABLET ORAL at 08:05

## 2024-05-13 RX ADMIN — PANTOPRAZOLE SODIUM 40 MG: 40 TABLET, DELAYED RELEASE ORAL at 08:05

## 2024-05-13 RX ADMIN — LEVOTHYROXINE SODIUM 125 MCG: 125 TABLET ORAL at 06:05

## 2024-05-13 RX ADMIN — HYDROMORPHONE HYDROCHLORIDE 0.5 MG: 2 INJECTION INTRAMUSCULAR; INTRAVENOUS; SUBCUTANEOUS at 04:05

## 2024-05-13 RX ADMIN — ATORVASTATIN CALCIUM 20 MG: 10 TABLET, FILM COATED ORAL at 08:05

## 2024-05-13 RX ADMIN — SODIUM CHLORIDE 1000 MG: 1 TABLET ORAL at 08:05

## 2024-05-13 RX ADMIN — ACETAMINOPHEN 325MG 650 MG: 325 TABLET ORAL at 12:05

## 2024-05-13 RX ADMIN — METOPROLOL SUCCINATE 50 MG: 50 TABLET, EXTENDED RELEASE ORAL at 08:05

## 2024-05-13 RX ADMIN — ACETAMINOPHEN 1000 MG: 500 TABLET ORAL at 02:05

## 2024-05-13 RX ADMIN — LISINOPRIL 30 MG: 20 TABLET ORAL at 08:05

## 2024-05-13 RX ADMIN — ACETAMINOPHEN 325MG 650 MG: 325 TABLET ORAL at 08:05

## 2024-05-13 RX ADMIN — HYDROMORPHONE HYDROCHLORIDE 0.5 MG: 2 INJECTION INTRAMUSCULAR; INTRAVENOUS; SUBCUTANEOUS at 02:05

## 2024-05-13 RX ADMIN — HYDROMORPHONE HYDROCHLORIDE 0.5 MG: 2 INJECTION INTRAMUSCULAR; INTRAVENOUS; SUBCUTANEOUS at 07:05

## 2024-05-13 RX ADMIN — AMLODIPINE BESYLATE 5 MG: 5 TABLET ORAL at 08:05

## 2024-05-13 RX ADMIN — ONDANSETRON 4 MG: 2 INJECTION INTRAMUSCULAR; INTRAVENOUS at 02:05

## 2024-05-13 NOTE — CARE UPDATE
I was paged by nurse that she had a syncopal event while on the bedside commode for few minutes.  Felt vasovagal event.    Then again soon paged she was lethargic and hypotensive.    Came up to the floor and patient is very weak appearing, had vomited few times, complaints of severe back pain.  Both daughters Florence and Shirley were present for me as well and discussed we are not in a good situation right now with her being hypotensive and complaining of severe pain.    Discussed option of hospice and they actually were looking into [facility] already inpatient.  Called [facility] however they were full and no bed available.  Spoke with Kiki with [facility] per family request of any available bed for inpatient hospice and she will be coming to eval soon.  At this time ultimate goal is comfort measures and pain control.  I have discussed with them we will stop checking pressures and will complete the 1L IVF bolus that was ordered.    IV Dilaudid ordered for pain control.

## 2024-05-13 NOTE — PT/OT/SLP PROGRESS
Physical Therapy      Patient Name:  Jacques Richmond   MRN:  [id number]    Patient not seen today secondary to nursing hold . Will follow-up appropriate.

## 2024-05-13 NOTE — PT/OT/SLP DISCHARGE
Occupational Therapy Discharge Summary    Jacques Richmond  MRN: [id number]   Principal Problem: Compression fracture of T9 vertebra      Patient Discharged from acute Occupational Therapy on 5/13/24.  Please refer to prior OT note dated 5/9/24 for functional status.    Assessment:       Pt c syncopal event and an episode of hypotension and lethargy. Discussed case c RN who reported family is transitioning patient to inpatient hospice services. OT to sign off.     Objective:     GOALS:   Multidisciplinary Problems       Occupational Therapy Goals          Problem: Occupational Therapy    Goal Priority Disciplines Outcome Interventions   Occupational Therapy Goal     OT, PT/OT     Description: LTG: Pt will perform basic ADLs and ADL transfers with Modified independence using LRAD by discharge.    STG: to be met by 6/9    Pt will complete grooming standing at sink with LRAD with SBA.  Pt will complete UB dressing with SBA.  Pt will complete LB dressing with SBA using LRAD.  Pt will complete toileting with SBA using LRAD.  Pt will complete functional mobility to/from toilet and toilet transfer with SBA using LRAD.                        Reasons for Discontinuation of Therapy Services  Transfer to alternate level of care.      Plan:     Patient Discharged to: Palliative Care/Hospice    5/13/2024

## 2024-05-13 NOTE — PLAN OF CARE
[facility] uploaded discharge orders/AVS and clinicals to [facility] via [employer]. Report packet given to nurse.

## 2024-05-13 NOTE — PLAN OF CARE
05/13/24 1106   Final Note   Assessment Type Final Discharge Note   Anticipated Discharge Disposition SNF   Hospital Resources/Appts/Education Provided Post-Acute resouces added to AVS   Post-Acute Status   Post-Acute Authorization Placement   Post-Acute Placement Status Set-up Complete/Auth obtained   Discharge Delays None known at this time     Pt discharging to [facility]. Transport will be provided by [facility] once report is called. SW contacted pt's family and provided update.    Shruti Bishop LCSW    Pt experienced episode of unresponsiveness this afternoon. MD evaluated at bedside and spoke to family about hospice care. Family wishing to proceed with inpatient hospice if able. [place]'s full. Referral sent to [facility] via [employer]. Kiki with [facility] to meet with pt and family at bedside this afternoon.     Pt discharging to [facility]. D/c order sent via [employer]. Transport placed in will-call with [facility]. Nurse to take out of will-call once report is called.

## 2024-05-13 NOTE — DISCHARGE SUMMARY
[facility] HOSPITAL MEDICINE - DISCHARGE SUMMARY    Patient Name: Jacques Richmond  MRN: [id number]  Admission Date: 5/7/2024  Discharge Date: 05/13/2024  Hospital Length of Stay: 6 days  Discharge Provider: Samir Woods MD  Primary Care Provider: Estrella Moraes NP      HOSPITAL COURSE   89 year old female with a pmh of HTN, HLD, DM2, Hypothyroidism, GERD, osteoarthritis, and anxiety who presented to the ED with c/o back pain, not relieved by prescription pain meds.  She had a fall on 5/2 and was seen in this ED and dx with T9 compression fracture. Neurosurgery was consulted, she refused further imaging and wanted to go home. She was fitted for a TLSO brace at that time and told to f/u with neurosurgery and was prescribed Norco 5 1/2 tab every 6 hours. She states that she has continued to have unrelenting pain, so she decided to come back in.     ED vitals on arrival:  Temp 97.7° F, pulse 90, resp 18, /89, SpO2 97% on RA. No lab work performed while in the ED. CT thoracic spine without contrast showed T9 compression fracture consistent with vertebral plana with some posterior buckling of the mid fracture fragment.  Large mass in the left lung apex with a pleural-based area of nodularity seen in the left upper hemothorax as well.  Both of these lesions are larger than the prior examination.  Bilateral pleural effusion slightly worse than the prior examination.  Some mild supraclavicular lymphadenopathy on the right side.  CT lumbar spine without contrast showed chronic changes seen in the lumbar spine.  Neurosurgery was consulted in the ED. She was admitted to Hospital Medicine for management.  NSGY recommended TSLO brace, follow up in 4-6 weeks.   She did receive percocet to help control pain but then developed some confusion which could have been from percocet so this was discontinued at the request of family.    As far as the lung mass and other spots daughter did not want  aggressive work up given her age and dr hines had initiated levaquin in case this was related to a pneumonia so we will complete a 7 day course.    At this time she will be discharged to nursing home facility with family opting to not purse aggressive measures.  She did have hyponatremia which could have been from the lung mass itself if it was small cell. But we will not know.       Addendum 433pm  Patient had a decline, family opted for inpatient hospice.  Refer to separate care update note.            PHYSICAL EXAM     Most Recent Vital Signs:  Temp: 98 °F (36.7 °C) (05/13/24 1049)  Pulse: 76 (05/13/24 1049)  Resp: 18 (05/13/24 1049)  BP: 112/63 (05/13/24 1049)  SpO2: 96 % (05/13/24 1049)   GENERAL: In no acute distress, afebrile  HEENT:  CHEST: Clear to auscultation bilaterally  HEART: S1, S2, no appreciable murmur  ABDOMEN: Soft, nontender, BS +  MSK: Warm, no lower extremity edema, no clubbing or cyanosis  NEUROLOGIC: Alert and oriented x4, moving all extremities with good strength   INTEGUMENTARY:  PSYCHIATRY:          DISCHARGE DIAGNOSIS   T9 compression fracture   Hyponatremia   Leukocytosis  Left upper lobe lung mass   History of SIADH   Chronic microcytic anemia  Hypertension  Hyperlipidemia  Diabetes mellitus type 2   Hypothyroidism  GERD  Osteoarthritis   Anxiety        _____________________________________________________________________________      DISCHARGE MED REC     Current Discharge Medication List        START taking these medications    Details   amLODIPine (NORVASC) 10 MG tablet Take 1 tablet (10 mg total) by mouth once daily.    Comments: .      levoFLOXacin (LEVAQUIN) 500 MG tablet Take 1 tablet (500 mg total) by mouth once daily. for 4 days      LIDOcaine (LIDODERM) 5 % Place 1 patch onto the skin once daily. Remove & Discard patch within 12 hours or as directed by MD.  Apply to back at the site of pain.      sodium chloride 1,000 mg TbSO oral tablet Take 1 tablet (1,000 mg total) by  mouth once daily. for 7 days           CONTINUE these medications which have CHANGED    Details   metFORMIN (GLUCOPHAGE) 500 MG tablet Take 1 tablet (500 mg total) by mouth daily with breakfast.           CONTINUE these medications which have NOT CHANGED    Details   atorvastatin (LIPITOR) 20 MG tablet Take 20 mg by mouth once daily.      busPIRone (BUSPAR) 7.5 MG tablet Take 7.5 mg by mouth 3 (three) times daily.      levothyroxine (SYNTHROID) 125 MCG tablet Take 125 mcg by mouth before breakfast.      lisinopriL (PRINIVIL,ZESTRIL) 30 MG tablet Take 30 mg by mouth once daily.      metoprolol succinate (TOPROL-XL) 50 MG 24 hr tablet Take 50 mg by mouth once daily.      omeprazole (PRILOSEC OTC) 20 MG tablet Take 20 mg by mouth once daily.      acetaminophen (TYLENOL) 500 MG tablet Take 1 tablet (500 mg total) by mouth every 6 (six) hours as needed for Pain.  Refills: 0      polyethylene glycol (GLYCOLAX) 17 gram PwPk Take 17 g by mouth daily as needed for Constipation.  Refills: 0           STOP taking these medications       hydrocodone-acetaminophen (HYCET) solution 7.5-325 mg/15mL Comments:   Reason for Stopping:         docusate sodium (COLACE) 100 MG capsule Comments:   Reason for Stopping:         meloxicam (MOBIC) 15 MG tablet Comments:   Reason for Stopping:                  CONSULTS     Consults (From admission, onward)          Status Ordering Provider     Inpatient consult to Social Work/Case Management  Once        Provider:  (Not yet assigned)    Acknowledged PATRICIA ZIMMERMAN     Inpatient consult to Neurosurgery  Once        Provider:  Eric Vallejo MD    Completed NAZ HOLDER              FOLLOW UP      Contact information for follow-up providers       Estrella Moraes NP Follow up on 5/17/2024.    Specialty: Internal Medicine  Why: Office will call pt with specified time of appointment.  Contact information:  [address] [facility] [address] [phone]                        Contact information for after-discharge care       Destination       [facility] .    Service: Skilled Nursing  Contact information:  [address] [facility] [address]  [phone]                                       DISCHARGE INSTRUCTIONS     Explained in detail to the patient about the discharge plan, medications, and follow-up visits. Pt understands and agrees with the treatment plan.  Discharged Condition: stable  Diet as tolerated  Activities as tolerated  Discharge to: Inpatient hospice    TIME SPENT ON DISCHARGE   35 minutes        Samir Woods MD  Internal Medicine  Department of Hospital Medicine [facility]      This document was created using electronic dictation services.  Please excuse any errors that may have been made.  Contact me if any questions regarding documentation to clarify verbiage.

## 2024-05-14 NOTE — NURSING
[facility] arrived to transfer pt to [facility]. Pt in no acute distress at time of discharge. Daughters at bedside. Denies any questions or concerns at time of discharge.

## 2024-05-15 LAB
BACTERIA BLD CULT: NORMAL
BACTERIA BLD CULT: NORMAL

## 2024-05-16 ENCOUNTER — TELEPHONE (OUTPATIENT)
Dept: NEUROSURGERY | Facility: CLINIC | Age: 89
End: 2024-05-16
Payer: MEDICARE

## 2024-05-24 ENCOUNTER — TELEPHONE (OUTPATIENT)
Dept: NEUROSURGERY | Facility: CLINIC | Age: 89
End: 2024-05-24
Payer: MEDICARE

## 2024-05-24 NOTE — TELEPHONE ENCOUNTER
I spoke to pt daughter to schedule pt hospital f/u. She did inform me that the pt passed away last week. I will complete everything on my end.